# Patient Record
Sex: FEMALE | Race: WHITE | NOT HISPANIC OR LATINO | Employment: UNEMPLOYED | URBAN - METROPOLITAN AREA
[De-identification: names, ages, dates, MRNs, and addresses within clinical notes are randomized per-mention and may not be internally consistent; named-entity substitution may affect disease eponyms.]

---

## 2017-04-17 ENCOUNTER — APPOINTMENT (OUTPATIENT)
Dept: MRI IMAGING | Facility: HOSPITAL | Age: 57
End: 2017-04-17
Attending: EMERGENCY MEDICINE

## 2017-04-17 ENCOUNTER — APPOINTMENT (OUTPATIENT)
Dept: CT IMAGING | Facility: HOSPITAL | Age: 57
End: 2017-04-17

## 2017-04-17 ENCOUNTER — HOSPITAL ENCOUNTER (INPATIENT)
Facility: HOSPITAL | Age: 57
LOS: 3 days | Discharge: HOME HEALTH | End: 2017-04-20
Attending: EMERGENCY MEDICINE | Admitting: HOSPITALIST

## 2017-04-17 DIAGNOSIS — R26.9 GAIT DISTURBANCE: ICD-10-CM

## 2017-04-17 DIAGNOSIS — M47.12 CERVICAL SPONDYLOSIS WITH MYELOPATHY: Primary | ICD-10-CM

## 2017-04-17 DIAGNOSIS — Z74.09 IMPAIRED FUNCTIONAL MOBILITY, BALANCE, GAIT, AND ENDURANCE: ICD-10-CM

## 2017-04-17 DIAGNOSIS — Z78.9 IMPAIRED MOBILITY AND ADLS: ICD-10-CM

## 2017-04-17 DIAGNOSIS — Z74.09 IMPAIRED MOBILITY AND ADLS: ICD-10-CM

## 2017-04-17 DIAGNOSIS — M48.02 SPINAL STENOSIS OF CERVICAL REGION: ICD-10-CM

## 2017-04-17 DIAGNOSIS — R20.2 PARESTHESIA OF UPPER AND LOWER EXTREMITIES OF BOTH SIDES: ICD-10-CM

## 2017-04-17 LAB
ALBUMIN SERPL-MCNC: 4.6 G/DL (ref 3.2–4.8)
ALBUMIN/GLOB SERPL: 1.2 G/DL (ref 1.5–2.5)
ALP SERPL-CCNC: 176 U/L (ref 25–100)
ALT SERPL W P-5'-P-CCNC: 17 U/L (ref 7–40)
AMPHET+METHAMPHET UR QL: NEGATIVE
AMPHETAMINES UR QL: NEGATIVE
ANION GAP SERPL CALCULATED.3IONS-SCNC: 3 MMOL/L (ref 3–11)
AST SERPL-CCNC: 25 U/L (ref 0–33)
BACTERIA UR QL AUTO: ABNORMAL /HPF
BARBITURATES UR QL SCN: NEGATIVE
BASOPHILS # BLD AUTO: 0.06 10*3/MM3 (ref 0–0.2)
BASOPHILS NFR BLD AUTO: 0.7 % (ref 0–1)
BENZODIAZ UR QL SCN: NEGATIVE
BILIRUB SERPL-MCNC: 0.2 MG/DL (ref 0.3–1.2)
BILIRUB UR QL STRIP: NEGATIVE
BUN BLD-MCNC: 7 MG/DL (ref 9–23)
BUN/CREAT SERPL: 8.8 (ref 7–25)
BUPRENORPHINE SERPL-MCNC: NEGATIVE NG/ML
CALCIUM SPEC-SCNC: 10.6 MG/DL (ref 8.7–10.4)
CANNABINOIDS SERPL QL: POSITIVE
CHLORIDE SERPL-SCNC: 101 MMOL/L (ref 99–109)
CK SERPL-CCNC: 124 U/L (ref 26–174)
CLARITY UR: CLEAR
CO2 SERPL-SCNC: 34 MMOL/L (ref 20–31)
COCAINE UR QL: NEGATIVE
COLOR UR: YELLOW
CREAT BLD-MCNC: 0.8 MG/DL (ref 0.6–1.3)
DEPRECATED RDW RBC AUTO: 48.1 FL (ref 37–54)
EOSINOPHIL # BLD AUTO: 0.16 10*3/MM3 (ref 0.1–0.3)
EOSINOPHIL NFR BLD AUTO: 1.8 % (ref 0–3)
ERYTHROCYTE [DISTWIDTH] IN BLOOD BY AUTOMATED COUNT: 14.7 % (ref 11.3–14.5)
GFR SERPL CREATININE-BSD FRML MDRD: 75 ML/MIN/1.73
GLOBULIN UR ELPH-MCNC: 3.7 GM/DL
GLUCOSE BLD-MCNC: 75 MG/DL (ref 70–100)
GLUCOSE UR STRIP-MCNC: NEGATIVE MG/DL
HCT VFR BLD AUTO: 38.7 % (ref 34.5–44)
HGB BLD-MCNC: 12.8 G/DL (ref 11.5–15.5)
HGB UR QL STRIP.AUTO: ABNORMAL
HYALINE CASTS UR QL AUTO: ABNORMAL /LPF
IMM GRANULOCYTES # BLD: 0.02 10*3/MM3 (ref 0–0.03)
IMM GRANULOCYTES NFR BLD: 0.2 % (ref 0–0.6)
INR PPP: 1.01
KETONES UR QL STRIP: NEGATIVE
LEUKOCYTE ESTERASE UR QL STRIP.AUTO: NEGATIVE
LYMPHOCYTES # BLD AUTO: 3.2 10*3/MM3 (ref 0.6–4.8)
LYMPHOCYTES NFR BLD AUTO: 35.1 % (ref 24–44)
MAGNESIUM SERPL-MCNC: 2 MG/DL (ref 1.3–2.7)
MCH RBC QN AUTO: 29.8 PG (ref 27–31)
MCHC RBC AUTO-ENTMCNC: 33.1 G/DL (ref 32–36)
MCV RBC AUTO: 90.2 FL (ref 80–99)
METHADONE UR QL SCN: NEGATIVE
MONOCYTES # BLD AUTO: 0.54 10*3/MM3 (ref 0–1)
MONOCYTES NFR BLD AUTO: 5.9 % (ref 0–12)
MYOGLOBIN SERPL-MCNC: 40 NG/ML (ref 3–110)
NEUTROPHILS # BLD AUTO: 5.14 10*3/MM3 (ref 1.5–8.3)
NEUTROPHILS NFR BLD AUTO: 56.3 % (ref 41–71)
NITRITE UR QL STRIP: NEGATIVE
OPIATES UR QL: NEGATIVE
OXYCODONE UR QL SCN: NEGATIVE
PCP UR QL SCN: NEGATIVE
PH UR STRIP.AUTO: 8 [PH] (ref 5–8)
PLATELET # BLD AUTO: 448 10*3/MM3 (ref 150–450)
PMV BLD AUTO: 9.8 FL (ref 6–12)
POTASSIUM BLD-SCNC: 3.7 MMOL/L (ref 3.5–5.5)
PROPOXYPH UR QL: NEGATIVE
PROT SERPL-MCNC: 8.3 G/DL (ref 5.7–8.2)
PROT UR QL STRIP: NEGATIVE
PROTHROMBIN TIME: 11 SECONDS (ref 9.6–11.5)
RBC # BLD AUTO: 4.29 10*6/MM3 (ref 3.89–5.14)
RBC # UR: ABNORMAL /HPF
REF LAB TEST METHOD: ABNORMAL
SODIUM BLD-SCNC: 138 MMOL/L (ref 132–146)
SP GR UR STRIP: 1.01 (ref 1–1.03)
SQUAMOUS #/AREA URNS HPF: ABNORMAL /HPF
T4 FREE SERPL-MCNC: 1.11 NG/DL (ref 0.89–1.76)
TRICYCLICS UR QL SCN: NEGATIVE
TSH SERPL DL<=0.05 MIU/L-ACNC: 1.26 MIU/ML (ref 0.35–5.35)
UROBILINOGEN UR QL STRIP: ABNORMAL
VIT B12 BLD-MCNC: 487 PG/ML (ref 211–911)
WBC NRBC COR # BLD: 9.12 10*3/MM3 (ref 3.5–10.8)
WBC UR QL AUTO: ABNORMAL /HPF

## 2017-04-17 PROCEDURE — 85025 COMPLETE CBC W/AUTO DIFF WBC: CPT | Performed by: EMERGENCY MEDICINE

## 2017-04-17 PROCEDURE — 83921 ORGANIC ACID SINGLE QUANT: CPT | Performed by: EMERGENCY MEDICINE

## 2017-04-17 PROCEDURE — 0 GADOBENATE DIMEGLUMINE 529 MG/ML SOLUTION: Performed by: EMERGENCY MEDICINE

## 2017-04-17 PROCEDURE — 99220 PR INITIAL OBSERVATION CARE/DAY 70 MINUTES: CPT | Performed by: FAMILY MEDICINE

## 2017-04-17 PROCEDURE — 83735 ASSAY OF MAGNESIUM: CPT | Performed by: EMERGENCY MEDICINE

## 2017-04-17 PROCEDURE — 82607 VITAMIN B-12: CPT | Performed by: EMERGENCY MEDICINE

## 2017-04-17 PROCEDURE — 84439 ASSAY OF FREE THYROXINE: CPT | Performed by: EMERGENCY MEDICINE

## 2017-04-17 PROCEDURE — 82390 ASSAY OF CERULOPLASMIN: CPT | Performed by: EMERGENCY MEDICINE

## 2017-04-17 PROCEDURE — 82550 ASSAY OF CK (CPK): CPT | Performed by: EMERGENCY MEDICINE

## 2017-04-17 PROCEDURE — 72156 MRI NECK SPINE W/O & W/DYE: CPT

## 2017-04-17 PROCEDURE — 84425 ASSAY OF VITAMIN B-1: CPT | Performed by: EMERGENCY MEDICINE

## 2017-04-17 PROCEDURE — 80053 COMPREHEN METABOLIC PANEL: CPT | Performed by: EMERGENCY MEDICINE

## 2017-04-17 PROCEDURE — G0378 HOSPITAL OBSERVATION PER HR: HCPCS

## 2017-04-17 PROCEDURE — 99285 EMERGENCY DEPT VISIT HI MDM: CPT

## 2017-04-17 PROCEDURE — 85610 PROTHROMBIN TIME: CPT | Performed by: NEUROLOGICAL SURGERY

## 2017-04-17 PROCEDURE — 84207 ASSAY OF VITAMIN B-6: CPT | Performed by: EMERGENCY MEDICINE

## 2017-04-17 PROCEDURE — 80306 DRUG TEST PRSMV INSTRMNT: CPT | Performed by: EMERGENCY MEDICINE

## 2017-04-17 PROCEDURE — 70553 MRI BRAIN STEM W/O & W/DYE: CPT

## 2017-04-17 PROCEDURE — 82525 ASSAY OF COPPER: CPT | Performed by: EMERGENCY MEDICINE

## 2017-04-17 PROCEDURE — 72158 MRI LUMBAR SPINE W/O & W/DYE: CPT

## 2017-04-17 PROCEDURE — 84443 ASSAY THYROID STIM HORMONE: CPT | Performed by: EMERGENCY MEDICINE

## 2017-04-17 PROCEDURE — 83874 ASSAY OF MYOGLOBIN: CPT | Performed by: EMERGENCY MEDICINE

## 2017-04-17 PROCEDURE — 25010000002 LORAZEPAM PER 2 MG: Performed by: EMERGENCY MEDICINE

## 2017-04-17 PROCEDURE — 72157 MRI CHEST SPINE W/O & W/DYE: CPT

## 2017-04-17 PROCEDURE — 99253 IP/OBS CNSLTJ NEW/EST LOW 45: CPT | Performed by: PHYSICIAN ASSISTANT

## 2017-04-17 PROCEDURE — 72125 CT NECK SPINE W/O DYE: CPT

## 2017-04-17 PROCEDURE — 81001 URINALYSIS AUTO W/SCOPE: CPT | Performed by: EMERGENCY MEDICINE

## 2017-04-17 PROCEDURE — A9577 INJ MULTIHANCE: HCPCS | Performed by: EMERGENCY MEDICINE

## 2017-04-17 RX ORDER — SODIUM CHLORIDE 0.9 % (FLUSH) 0.9 %
1-10 SYRINGE (ML) INJECTION AS NEEDED
Status: DISCONTINUED | OUTPATIENT
Start: 2017-04-17 | End: 2017-04-19 | Stop reason: SDUPTHER

## 2017-04-17 RX ORDER — OXYCODONE HYDROCHLORIDE AND ACETAMINOPHEN 5; 325 MG/1; MG/1
1 TABLET ORAL EVERY 4 HOURS PRN
Status: DISCONTINUED | OUTPATIENT
Start: 2017-04-17 | End: 2017-04-20 | Stop reason: HOSPADM

## 2017-04-17 RX ORDER — DOCUSATE SODIUM 100 MG/1
100 CAPSULE, LIQUID FILLED ORAL 2 TIMES DAILY
Status: DISCONTINUED | OUTPATIENT
Start: 2017-04-17 | End: 2017-04-20 | Stop reason: HOSPADM

## 2017-04-17 RX ORDER — DULOXETIN HYDROCHLORIDE 60 MG/1
120 CAPSULE, DELAYED RELEASE ORAL DAILY
Status: DISCONTINUED | OUTPATIENT
Start: 2017-04-17 | End: 2017-04-20 | Stop reason: HOSPADM

## 2017-04-17 RX ORDER — TRIAMTERENE AND HYDROCHLOROTHIAZIDE 37.5; 25 MG/1; MG/1
1 TABLET ORAL DAILY
Status: DISCONTINUED | OUTPATIENT
Start: 2017-04-18 | End: 2017-04-20 | Stop reason: HOSPADM

## 2017-04-17 RX ORDER — PANTOPRAZOLE SODIUM 40 MG/1
40 TABLET, DELAYED RELEASE ORAL
Status: DISCONTINUED | OUTPATIENT
Start: 2017-04-18 | End: 2017-04-20 | Stop reason: HOSPADM

## 2017-04-17 RX ORDER — LORAZEPAM 2 MG/ML
1 INJECTION INTRAMUSCULAR EVERY 4 HOURS PRN
Status: DISCONTINUED | OUTPATIENT
Start: 2017-04-17 | End: 2017-04-17

## 2017-04-17 RX ORDER — TRIAMTERENE AND HYDROCHLOROTHIAZIDE 37.5; 25 MG/1; MG/1
1 CAPSULE ORAL EVERY MORNING
COMMUNITY

## 2017-04-17 RX ORDER — LORAZEPAM 0.5 MG/1
0.5 TABLET ORAL EVERY 6 HOURS PRN
Status: DISCONTINUED | OUTPATIENT
Start: 2017-04-17 | End: 2017-04-20 | Stop reason: HOSPADM

## 2017-04-17 RX ORDER — ONDANSETRON 2 MG/ML
4 INJECTION INTRAMUSCULAR; INTRAVENOUS EVERY 6 HOURS PRN
Status: DISCONTINUED | OUTPATIENT
Start: 2017-04-17 | End: 2017-04-19 | Stop reason: SDUPTHER

## 2017-04-17 RX ORDER — NICOTINE 21 MG/24HR
1 PATCH, TRANSDERMAL 24 HOURS TRANSDERMAL DAILY
Status: DISCONTINUED | OUTPATIENT
Start: 2017-04-17 | End: 2017-04-20 | Stop reason: HOSPADM

## 2017-04-17 RX ORDER — ESOMEPRAZOLE MAGNESIUM 40 MG/1
40 CAPSULE, DELAYED RELEASE ORAL DAILY
COMMUNITY
End: 2018-03-05

## 2017-04-17 RX ORDER — LORAZEPAM 2 MG/ML
1 INJECTION INTRAMUSCULAR ONCE
Status: COMPLETED | OUTPATIENT
Start: 2017-04-17 | End: 2017-04-17

## 2017-04-17 RX ORDER — ONDANSETRON 4 MG/1
4 TABLET, FILM COATED ORAL EVERY 6 HOURS PRN
Status: DISCONTINUED | OUTPATIENT
Start: 2017-04-17 | End: 2017-04-19 | Stop reason: SDUPTHER

## 2017-04-17 RX ORDER — ACETAMINOPHEN 325 MG/1
650 TABLET ORAL EVERY 4 HOURS PRN
Status: DISCONTINUED | OUTPATIENT
Start: 2017-04-17 | End: 2017-04-20 | Stop reason: HOSPADM

## 2017-04-17 RX ADMIN — GADOBENATE DIMEGLUMINE 11 ML: 529 INJECTION, SOLUTION INTRAVENOUS at 13:25

## 2017-04-17 RX ADMIN — NICOTINE 1 PATCH: 21 PATCH, EXTENDED RELEASE TRANSDERMAL at 19:29

## 2017-04-17 RX ADMIN — LORAZEPAM 1 MG: 2 INJECTION INTRAMUSCULAR; INTRAVENOUS at 12:02

## 2017-04-17 RX ADMIN — DOCUSATE SODIUM 100 MG: 100 CAPSULE, LIQUID FILLED ORAL at 19:29

## 2017-04-17 NOTE — H&P
[facility] Medicine Services  HISTORY AND PHYSICAL    Primary Care Physician: Asael Lockhart MD    Subjective     Chief Complaint:  Neck pain, arm and leg numbness    History of Present Illness:      The patient is a 56 year old female who presented to the ED with complaints of numbness of the hands for approximately one year that has been worsening over the past month.  The patient reported that about a year ago she began having neck pain and spasms that have progressively worsened.  She did see a chiropractor at the time with no significant improvement of her symptoms.  She has had some progressive numbness and weakness since then, now in all of her extremities.  The patient has also reported some urinary incontinence as well as some constipation and fecal incontinence over the past month or so.  She states that she has been having some difficulty with ambulation and an unsteady gait for approximately one month.  The patient was concerned that she was having a stroke and did undergo imaging of her brain that was performed by her PCP, but she hasn't had any imaging of her spine that she knows of.  In the ED, she underwent an MRI of her C spine that showed severe central spinal canal stenosis at the C3/C4 level with large left paracentral disc protrusion.  Narrowing of C5/C6 and C6/C7 with mass effect on the spinal cord.  Dr. Garner was consulted by the ED and she will be taken to the OR tomorrow for a laminectomy.  Hospital medicine services was contacted for admission.         Review of Systems   Constitutional: Positive for activity change and fatigue.   HENT: Negative for drooling and trouble swallowing.    Eyes: Positive for visual disturbance (blurry vision).   Respiratory: Positive for cough (loose) and shortness of breath. Negative for wheezing.    Cardiovascular: Negative for chest pain and palpitations.   Gastrointestinal: Positive for abdominal distention, abdominal pain  and constipation. Negative for nausea and vomiting.        Bowel incontinence at times   Endocrine: Negative.    Genitourinary: Positive for difficulty urinating and urgency.        Urinary incontinence, difficulty initiating, can't empty   Musculoskeletal: Positive for arthralgias (knees), gait problem, neck pain and neck stiffness.   Allergic/Immunologic: Negative.    Neurological: Positive for dizziness, weakness (generalized) and headaches.   Hematological: Negative.    Psychiatric/Behavioral: Positive for confusion (occasonal).      Otherwise complete ROS performed and negative except as mentioned in the HPI.    Past Medical History:   Diagnosis Date   • Arthritis    • Depression    • GERD (gastroesophageal reflux disease)    • Hypertension        Past Surgical History:   Procedure Laterality Date   • CHOLECYSTECTOMY     • DILATATION AND CURETTAGE     • TUBAL ABDOMINAL LIGATION  04/2004    [facility]       Family History   Problem Relation Age of Onset   • Diabetes Mother    • Hypertension Mother    • Osteoporosis Mother    • Hypertension Father    • Heart disease Father    • Osteoporosis Father    • Diabetes Maternal Grandmother    • Hypertension Maternal Grandmother    • Heart disease Paternal Grandfather    • Osteoporosis Paternal Grandfather        Social History     Social History   • Marital status: Single     Spouse name: N/A   • Number of children: N/A   • Years of education: N/A     Occupational History   •  [employer]     Social History Main Topics   • Smoking status: Current Every Day Smoker     Packs/day: 1.00     Years: 35.00     Types: Cigarettes   • Smokeless tobacco: Not on file   • Alcohol use No   • Drug use: Yes     Special: Marijuana, Cocaine      Comment: HISTORY OF   • Sexual activity: Defer     Other Topics Concern   • Not on file     Social History Narrative   • No narrative on file       Medications:    (Not in a hospital admission)    Allergies:  No Known  "Allergies      Objective     Physical Exam:  Vital Signs: /57  Pulse 77  Temp 98 °F (36.7 °C) (Oral)   Resp 16  Ht 60\" (152.4 cm)  Wt 125 lb (56.7 kg)  SpO2 (!) 86%  BMI 24.41 kg/m2  Physical Exam    General: Sitting up on the side of bed, NAD, family at bedside.  HEENT: Normocephalic, mucous membranes moist, pupils equal  Neck: Supple, trachea midline  Cardiovascular: Regular rate and rhythm, S1-S2, no murmur  Respiratory: Clear to auscultation bilaterally, even unlabored breathing on room air  Abdomen: Soft, nontender, nondistended, bowel sounds +  Skin: Clean, dry, intact, no lesions or sores  Extremities: DELEON, Symmetrical, pulses +, no edema noted  Neuro: Alert, oriented ×4, appropriate and cooperative,  and pedal pushes equal, Numbness of upper and lower extremities, severe ataxia noted, hyper-reflexive DTRs.      Results Reviewed:    Results from last 7 days  Lab Units 04/17/17  1127   WBC 10*3/mm3 9.12   HEMOGLOBIN g/dL 12.8   PLATELETS 10*3/mm3 448       Results from last 7 days  Lab Units 04/17/17  1127   SODIUM mmol/L 138   POTASSIUM mmol/L 3.7   TOTAL CO2 mmol/L 34.0*   CREATININE mg/dL 0.80   GLUCOSE mg/dL 75   CALCIUM mg/dL 10.6*       I have personally reviewed and interpreted available lab data, radiology studies and ECG obtained at time of admission.     Assessment / Plan     Assessment/Problem List:   Hospital Problem List     * (Principal)Cervical spondylosis with myelopathy    Hypertension    GERD (gastroesophageal reflux disease)    Arthritis    Depression        Plan:    -Prepare patient for surgery tomorrow.  NPO after midnight  -f/u remaining pending imaging, NS will also review  -Resume home antihypertensive  -Resume cymbalta  -Nicotine patch, tobacco cessation teaching.    -PT/OT        DVT prophylaxis: Per Neurosurgery, OR planned for am  Code Status: Full Code       Lian Gonzalez, PATRICIO 04/17/17 4:42 PM        Brief Attending Note       I have seen and examined the patient, " performing an independent face-to-face diagnostic evaluation with plan of care reviewed and developed with the advanced practice clinician (APC).      Brief Summary Statement/HPI:   Please see throrough history above as per APRN.  Briefly, active working 57yo F presents with 1 yr progressive hx of L>R BUE tingling/paresthesias and weakness associated with neck and upper arm muscular spasms.  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  Has also more recently developed gait disturbance (shuffling, widened gait) and saddle paresthesia with some urine/bowel incontinence episodes.  Patient was concerned was having possible TIA's, seen by PCP and sent to ED given history/exam. Cervical imaging confirms significant cervical myelopathy and thoracic imaging is stil pending.  NS has evaluated and will f/u other imaging but does tentatively plan for C3-4 laminectomy tomorrow am with further interventions to be determined based on pending work up.       Attending Physical Exam:  Temp:  [97.9 °F (36.6 °C)-98 °F (36.7 °C)] 97.9 °F (36.6 °C)  Heart Rate:  [55-77] 55  Resp:  [16] 16  BP: (129-177)/(44-80) 129/80  Constitutional: no acute distress, awake, alert  Eyes: PERRLA, sclerae anicteric, no conjunctival injection  Neck: supple, no thyromegaly, trachea midline  Respiratory: Clear to auscultation bilaterally, nonlabored respirations   Cardiovascular: RRR, no murmur  Gastrointestinal: Positive bowel sounds, soft, nontender, nondistended  Musculoskeletal: No bilateral ankle edema, no clubbing or cyanosis to bilateral lower extremities  Psychiatric: oriented x 3, appropriate affect, cooperative  Neurologic: Strength symmetric in all extremities, fine touch paresthesia of lateral BUE, widened unsteady stance and gait with hesitancy with iniation of step, Cranial Nerves  grossly intact to confrontation             Brief Assessment/Plan :      See above for further detailed assessment and plan developed with APC which I have reviewed and/or edited.    I believe this patient meets INPATIENT status due to the need for care which can only be reasonably provided in an hospital setting such as aggressive/expedited ancillary services and/or consultation services and the necessity for IV medications, close physician monitoring and/or the possible need for procedures.  In such, I feel patient’s risk for adverse outcomes and need for care warrant INPATIENT evaluation and predict the patient’s care encounter to likely last beyond 2 midnights.      Melissa Lawrence MD  04/17/17  7:37 PM

## 2017-04-17 NOTE — ED PROVIDER NOTES
Subjective   HPI Comments: Maribel López is a 56 y.o.female who presents to the ED c/o worsening numbness for the last year that has significantly worsened over the past month. Pt reports a year ago she began feeling neck spasms that progressively worsened. She saw a chiropractor who did not improve her sx. Since then she has had progressive numbness and weakness in all of her extremities. She also notes that she goes for days without a BM. She states she cant feel when she is passing a BM. She has had unremarkable upper endoscopy and MRI studies.    FHx of TIA. Dr. Lockhart is her PCP.  Patient is a 56 y.o. female presenting with general illness.   History provided by:  Patient and relative  Illness   Location:  Extremities  Quality:  Numb and weak  Severity:  Moderate  Onset quality:  Gradual  Duration:  12 months  Timing:  Constant  Progression:  Worsening  Chronicity:  New  Associated symptoms: no loss of consciousness and no shortness of breath        Review of Systems   Respiratory: Negative for shortness of breath.    Gastrointestinal: Positive for constipation.        Goes for 2-3 days without having a BM then will have bloating. Cannot feel feces passing.   Neurological: Positive for weakness and numbness. Negative for loss of consciousness.   All other systems reviewed and are negative.      Past Medical History:   Diagnosis Date   • Arthritis    • GERD (gastroesophageal reflux disease)    • Hypertension        No Known Allergies    Past Surgical History:   Procedure Laterality Date   • CHOLECYSTECTOMY     • DILATATION AND CURETTAGE     • TUBAL ABDOMINAL LIGATION  04/2004    [facility]       Family History   Problem Relation Age of Onset   • Diabetes Mother    • Hypertension Mother    • Osteoporosis Mother    • Hypertension Father    • Heart disease Father    • Osteoporosis Father    • Diabetes Maternal Grandmother    • Hypertension Maternal Grandmother    • Heart disease Paternal Grandfather    •  Osteoporosis Paternal Grandfather        Social History     Social History   • Marital status: Single     Spouse name: N/A   • Number of children: N/A   • Years of education: N/A     Social History Main Topics   • Smoking status: Current Every Day Smoker     Packs/day: 1.00     Types: Cigarettes   • Smokeless tobacco: None   • Alcohol use No   • Drug use: Yes     Special: Marijuana, Cocaine      Comment: HISTORY OF   • Sexual activity: Defer     Other Topics Concern   • None     Social History Narrative   • None         Objective   Physical Exam   Constitutional: She is oriented to person, place, and time. She appears well-developed and well-nourished.   HENT:   Head: Normocephalic and atraumatic.   Right Ear: External ear normal.   Left Ear: External ear normal.   Nose: Nose normal.   Eyes: Conjunctivae are normal.   Neck: Normal range of motion. Neck supple.   Cardiovascular: Normal rate, regular rhythm and normal heart sounds.  Exam reveals no gallop and no friction rub.    No murmur heard.  Pulmonary/Chest: Effort normal and breath sounds normal. No respiratory distress. She has no wheezes. She has no rales.   Abdominal: Soft. There is no tenderness.   Musculoskeletal: Normal range of motion.   Neurological: She is alert and oriented to person, place, and time. She has normal strength. She displays abnormal reflex. She exhibits normal muscle tone. Gait (Ataxic gait) abnormal.   Reflex Scores:       Bicep reflexes are 3+ on the right side and 3+ on the left side.       Patellar reflexes are 3+ on the right side and 3+ on the left side.  Romberg sign is present.   Skin: Skin is warm and dry.   Psychiatric: She has a normal mood and affect. Her behavior is normal. Judgment and thought content normal.   Nursing note and vitals reviewed.      Procedures         ED Course  ED Course     Grossly abnormal gait.  Hyperreflexic.  I discussed with Dr Esparza who recommended imaging entire spine as well as lab eval.  Labs  so far are pretty normal.  MRI shows significant spinal stenosis/disease at multiple levels with cord edema seen at C3-4 and C5-6/6-7.  I suspect this compression is the cause of her symptoms.  I discussed this result with Dr Garner who reviewed the images with me and he wishes to operate tomorrow.  Patient stable on serial rechecks.  Discussed exam findings, test results so far and concerns in detail at the bedside.  Discussed need for admission for further evaluation and treatment.                  MDM  Number of Diagnoses or Management Options  Gait disturbance:   Paresthesia of upper and lower extremities of both sides:   Spinal stenosis of cervical region:      Amount and/or Complexity of Data Reviewed  Clinical lab tests: ordered and reviewed  Tests in the radiology section of CPT®: ordered and reviewed  Discuss the patient with other providers: yes  Independent visualization of images, tracings, or specimens: yes        Final diagnoses:   Spinal stenosis of cervical region   Gait disturbance   Paresthesia of upper and lower extremities of both sides       Documentation assistance provided by jaziel Avendaño.  Information recorded by the scribe was done at my direction and has been verified and validated by me.     Ever Avendaño  04/17/17 1001       Rupesh Early MD  04/17/17 0475

## 2017-04-17 NOTE — PROGRESS NOTES
Informed consent for a C3 4 cervical laminectomy with possible fusion was obtained from the patient.  She acknowledges risk of spinal cord injury, nerve root injury, paralysis, weakness, loss sensation, permanent neurologic disability, bleeding, infection, iatrogenic instability, failure of benefit of the operation, or need for additional procedures.  All questions were answered.  No guarantees were given or implied.  We will tentatively plan for a C3 4 laminectomy for cervical myelopathy in the morning.

## 2017-04-17 NOTE — CONSULTS
NEUROSURGERY CONSULT    Referring Provider: No ref. provider found  Reason for Consultation:     Patient Care Team:  Asael Lockhart MD as PCP - General (Family Medicine)    Chief complaint: Gait disturbance, hand weakness, bowel and bladder dysfunction    Subjective .     History of present illness:    Ms. López is a 57yo F who began having symptoms of neck and arm spasms (left more than the right hand) approximately one year ago. There were no precipitating incidences or accidents.  She saw her doctor and has been for chiropractic care, but the symptoms have become progressively worse.  Over the last 4-5 months, she started having spasms into her legs and bilateral feet as well. Additionally, she has developed saddle anesthesia, constipation and inability to feel bowel movements.  She knows when her bladder is full, but has very little lead time to get to the restroom one she experiences this and has incontinence at home and sometimes at work.    Over the last few weeks, she has started having increasing gait disturbance with a few falls at home when walking on a flat surface. She fell about two weeks ago, striking the wall and leaving a hole in the drywall.  She said her hand symptoms have increased since then. She has also had increased numbness in her right hand and can no longer hold a pen or write.  She states that she has been working up until this point, and has been able to type, but has become increasingly unable to meet her job responsibilities.    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  She has not been using a cane, walker, or other support.    She has been concerned that she was having TIA's due to a family history of these and saw her PCP today. They reviewed her MRI and sent her to the ED.     Results Review:  MRI of the cervical spine shows:  "  Abnormal signal intensity within the spinal cord with severe  central spinal canal stenosis at the C3/C4 level. Large left paracentral  disc protrusion creating severe central spinal canal stenosis and  narrowing of the left neuroforamina. Compromise again seen with edema in  the spinal cord. There is narrowing as well seen at the C5/C6 and C6/C7  levels with mass effect on the spinal cord. Minimal cord changes seen at  the C5/C6 level posteriorly. Clinical correlation is needed.    MRI of the brain, thoracic, and lumbar spine were unremarkable.     Review of Systems  Pertinent items are noted in HPI, all other systems reviewed and are negative.     History  Past Medical History:   Diagnosis Date   • Arthritis    • Depression    • GERD (gastroesophageal reflux disease)    • Hypertension    , Past Surgical History:   Procedure Laterality Date   • CHOLECYSTECTOMY     • DILATATION AND CURETTAGE     • TUBAL ABDOMINAL LIGATION  04/2004    [facility]   , Family History   Problem Relation Age of Onset   • Diabetes Mother    • Hypertension Mother    • Osteoporosis Mother    • Hypertension Father    • Heart disease Father    • Osteoporosis Father    • Diabetes Maternal Grandmother    • Hypertension Maternal Grandmother    • Heart disease Paternal Grandfather    • Osteoporosis Paternal Grandfather    , Social History   Substance Use Topics   • Smoking status: Current Every Day Smoker     Packs/day: 1.00     Years: 35.00     Types: Cigarettes   • Smokeless tobacco: None   • Alcohol use No   ,   (Not in a hospital admission) and Allergies:  Review of patient's allergies indicates no known allergies.    Objective     Vital Signs   Blood pressure 156/44, pulse 57, temperature 98 °F (36.7 °C), temperature source Oral, resp. rate 16, height 60\" (152.4 cm), weight 125 lb (56.7 kg), SpO2 97 %.    Physical Exam:  Pt is awake, alert, and oriented x3. She is pleasant and cooperative.  Recent and remote memory are intact.       CRANIAL " NERVES:  Cranial nerve II:  Visual fields are full to confrontation.  Cranial nerves III, IV and VI:  PERRLADC.  Extraocular movements are intact.  Nystagmus is not present.  Cranial nerve V:  Facial sensation is intact to light touch.  Cranial nerve VII:  Muscles of facial expression reveal no asymmetry.  Cranial nerve VIII:  Hearing is intact to finger rub bilaterally.  Cranial nerves IX and X:  Palate elevates symmetrically.  Cranial nerve XI:  Shoulder shrug is intact.  Cranial nerve XII:  Tongue is midline without evidence of atrophy or fasciculation.    MOTOR: UE strength is 4/5 to direct testing.  strength is 3/5 bilaterally.    No hand atrophy.  Hip flexion, knee extension, ankle dorsiflexion and plantar flexion intact. Severe gait ataxia is present, with a wide-based gait.     SENSATION: numbness to light touch present in left upper extremity from shoulder to hand. In right hand, from forearm into hand, worst in three smallest fingers.    LE subjective numbness medial thigh, lateral thigh, and in bilateral calves and feet.  Position sense is intact.      REFLEXES: Hyperreflexive in upper and lower extremities, 3+.  Beck's positive bilaterally.  Cross-adductor reflex is present in lower extremities.      Assessment/Plan   Dr. Garner reviewed MRI of the cervical spine and recommends a posterior cervical laminectomy at C3-4. We have discussed the diagnosis, and procedure with Ms. López and outlined benefits and risks of the surgery. She is wanting to proceed.  She will be on the schedule for tomorrow.  She needs to be NPO after midnight.     Mica Wynn PA-C  04/17/17  5:35 PM

## 2017-04-18 ENCOUNTER — ANESTHESIA (OUTPATIENT)
Dept: PERIOP | Facility: HOSPITAL | Age: 57
End: 2017-04-18

## 2017-04-18 ENCOUNTER — APPOINTMENT (OUTPATIENT)
Dept: GENERAL RADIOLOGY | Facility: HOSPITAL | Age: 57
End: 2017-04-18

## 2017-04-18 ENCOUNTER — ANESTHESIA EVENT (OUTPATIENT)
Dept: PERIOP | Facility: HOSPITAL | Age: 57
End: 2017-04-18

## 2017-04-18 PROBLEM — Z72.0 TOBACCO ABUSE: Status: ACTIVE | Noted: 2017-04-18

## 2017-04-18 LAB
ALBUMIN SERPL-MCNC: 4.3 G/DL (ref 3.2–4.8)
ALBUMIN/GLOB SERPL: 1.4 G/DL (ref 1.5–2.5)
ALP SERPL-CCNC: 153 U/L (ref 25–100)
ALT SERPL W P-5'-P-CCNC: 17 U/L (ref 7–40)
ANION GAP SERPL CALCULATED.3IONS-SCNC: 8 MMOL/L (ref 3–11)
AST SERPL-CCNC: 20 U/L (ref 0–33)
B-HCG UR QL: NEGATIVE
BASOPHILS # BLD AUTO: 0.04 10*3/MM3 (ref 0–0.2)
BASOPHILS NFR BLD AUTO: 0.6 % (ref 0–1)
BILIRUB SERPL-MCNC: 0.4 MG/DL (ref 0.3–1.2)
BUN BLD-MCNC: 8 MG/DL (ref 9–23)
BUN/CREAT SERPL: 10 (ref 7–25)
CALCIUM SPEC-SCNC: 10.2 MG/DL (ref 8.7–10.4)
CERULOPLASMIN SERPL-MCNC: 46.9 MG/DL (ref 19–39)
CHLORIDE SERPL-SCNC: 101 MMOL/L (ref 99–109)
CO2 SERPL-SCNC: 29 MMOL/L (ref 20–31)
CREAT BLD-MCNC: 0.8 MG/DL (ref 0.6–1.3)
DEPRECATED RDW RBC AUTO: 48.6 FL (ref 37–54)
EOSINOPHIL # BLD AUTO: 0.19 10*3/MM3 (ref 0.1–0.3)
EOSINOPHIL NFR BLD AUTO: 2.8 % (ref 0–3)
ERYTHROCYTE [DISTWIDTH] IN BLOOD BY AUTOMATED COUNT: 14.8 % (ref 11.3–14.5)
GFR SERPL CREATININE-BSD FRML MDRD: 75 ML/MIN/1.73
GLOBULIN UR ELPH-MCNC: 3 GM/DL
GLUCOSE BLD-MCNC: 86 MG/DL (ref 70–100)
HCT VFR BLD AUTO: 36.9 % (ref 34.5–44)
HGB BLD-MCNC: 12.1 G/DL (ref 11.5–15.5)
IMM GRANULOCYTES # BLD: 0 10*3/MM3 (ref 0–0.03)
IMM GRANULOCYTES NFR BLD: 0 % (ref 0–0.6)
INR PPP: 1.03
INTERNAL NEGATIVE CONTROL: NEGATIVE
INTERNAL POSITIVE CONTROL: POSITIVE
LYMPHOCYTES # BLD AUTO: 2.67 10*3/MM3 (ref 0.6–4.8)
LYMPHOCYTES NFR BLD AUTO: 39.7 % (ref 24–44)
Lab: NORMAL
MCH RBC QN AUTO: 29.5 PG (ref 27–31)
MCHC RBC AUTO-ENTMCNC: 32.8 G/DL (ref 32–36)
MCV RBC AUTO: 90 FL (ref 80–99)
MONOCYTES # BLD AUTO: 0.69 10*3/MM3 (ref 0–1)
MONOCYTES NFR BLD AUTO: 10.3 % (ref 0–12)
NEUTROPHILS # BLD AUTO: 3.13 10*3/MM3 (ref 1.5–8.3)
NEUTROPHILS NFR BLD AUTO: 46.6 % (ref 41–71)
PLATELET # BLD AUTO: 429 10*3/MM3 (ref 150–450)
PMV BLD AUTO: 10.1 FL (ref 6–12)
POTASSIUM BLD-SCNC: 3.5 MMOL/L (ref 3.5–5.5)
PROT SERPL-MCNC: 7.3 G/DL (ref 5.7–8.2)
PROTHROMBIN TIME: 11.2 SECONDS (ref 9.6–11.5)
RBC # BLD AUTO: 4.1 10*6/MM3 (ref 3.89–5.14)
SODIUM BLD-SCNC: 138 MMOL/L (ref 132–146)
WBC NRBC COR # BLD: 6.72 10*3/MM3 (ref 3.5–10.8)

## 2017-04-18 PROCEDURE — 80053 COMPREHEN METABOLIC PANEL: CPT | Performed by: NURSE PRACTITIONER

## 2017-04-18 PROCEDURE — 63048 LAM FACETEC &FORAMOT EA ADDL: CPT | Performed by: NEUROLOGICAL SURGERY

## 2017-04-18 PROCEDURE — C1713 ANCHOR/SCREW BN/BN,TIS/BN: HCPCS | Performed by: NEUROLOGICAL SURGERY

## 2017-04-18 PROCEDURE — 25010000003 CEFAZOLIN IN DEXTROSE 2-4 GM/100ML-% SOLUTION: Performed by: NURSE ANESTHETIST, CERTIFIED REGISTERED

## 2017-04-18 PROCEDURE — 25010000002 HYDROMORPHONE PER 4 MG: Performed by: NEUROLOGICAL SURGERY

## 2017-04-18 PROCEDURE — 22840 INSERT SPINE FIXATION DEVICE: CPT | Performed by: NEUROLOGICAL SURGERY

## 2017-04-18 PROCEDURE — 0RG107J FUSION OF CERVICAL VERTEBRAL JOINT WITH AUTOLOGOUS TISSUE SUBSTITUTE, POSTERIOR APPROACH, ANTERIOR COLUMN, OPEN APPROACH: ICD-10-PCS | Performed by: NEUROLOGICAL SURGERY

## 2017-04-18 PROCEDURE — 25010000002 NEOSTIGMINE PER 0.5 MG: Performed by: NURSE ANESTHETIST, CERTIFIED REGISTERED

## 2017-04-18 PROCEDURE — 25010000002 PROPOFOL 10 MG/ML EMULSION: Performed by: NURSE ANESTHETIST, CERTIFIED REGISTERED

## 2017-04-18 PROCEDURE — 85025 COMPLETE CBC W/AUTO DIFF WBC: CPT | Performed by: NURSE PRACTITIONER

## 2017-04-18 PROCEDURE — G0378 HOSPITAL OBSERVATION PER HR: HCPCS

## 2017-04-18 PROCEDURE — 94799 UNLISTED PULMONARY SVC/PX: CPT

## 2017-04-18 PROCEDURE — 93005 ELECTROCARDIOGRAM TRACING: CPT | Performed by: ANESTHESIOLOGY

## 2017-04-18 PROCEDURE — 63045 LAM FACETEC & FORAMOT CRV: CPT | Performed by: NEUROLOGICAL SURGERY

## 2017-04-18 PROCEDURE — 25010000002 MIDAZOLAM PER 1 MG: Performed by: NURSE ANESTHETIST, CERTIFIED REGISTERED

## 2017-04-18 PROCEDURE — 25010000002 FENTANYL CITRATE (PF) 100 MCG/2ML SOLUTION: Performed by: NURSE ANESTHETIST, CERTIFIED REGISTERED

## 2017-04-18 PROCEDURE — 99225 PR SBSQ OBSERVATION CARE/DAY 25 MINUTES: CPT | Performed by: INTERNAL MEDICINE

## 2017-04-18 PROCEDURE — 76000 FLUOROSCOPY <1 HR PHYS/QHP: CPT

## 2017-04-18 PROCEDURE — 20936 SP BONE AGRFT LOCAL ADD-ON: CPT | Performed by: NEUROLOGICAL SURGERY

## 2017-04-18 PROCEDURE — 22600 ARTHRD PST TQ 1NTRSPC CRV: CPT | Performed by: NEUROLOGICAL SURGERY

## 2017-04-18 PROCEDURE — 25010000002 DEXAMETHASONE PER 1 MG: Performed by: NURSE ANESTHETIST, CERTIFIED REGISTERED

## 2017-04-18 PROCEDURE — 85610 PROTHROMBIN TIME: CPT | Performed by: NURSE PRACTITIONER

## 2017-04-18 PROCEDURE — 25010000002 ONDANSETRON PER 1 MG: Performed by: NURSE PRACTITIONER

## 2017-04-18 DEVICE — DBX PUTTY, 2.5CC
Type: IMPLANTABLE DEVICE | Status: FUNCTIONAL
Brand: DBX®

## 2017-04-18 DEVICE — ROD SPINE MOUNTAINEER 3.5X60MM: Type: IMPLANTABLE DEVICE | Status: FUNCTIONAL

## 2017-04-18 DEVICE — IMPLANTABLE DEVICE: Type: IMPLANTABLE DEVICE | Status: FUNCTIONAL

## 2017-04-18 DEVICE — SCRW INNR MOUNTAINEER: Type: IMPLANTABLE DEVICE | Status: FUNCTIONAL

## 2017-04-18 RX ORDER — TRIAMTERENE AND HYDROCHLOROTHIAZIDE 37.5; 25 MG/1; MG/1
1 TABLET ORAL ONCE
Status: COMPLETED | OUTPATIENT
Start: 2017-04-18 | End: 2017-04-18

## 2017-04-18 RX ORDER — NALOXONE HCL 0.4 MG/ML
0.4 VIAL (ML) INJECTION
Status: DISCONTINUED | OUTPATIENT
Start: 2017-04-18 | End: 2017-04-20 | Stop reason: HOSPADM

## 2017-04-18 RX ORDER — CEFAZOLIN SODIUM 2 G/100ML
INJECTION, SOLUTION INTRAVENOUS AS NEEDED
Status: DISCONTINUED | OUTPATIENT
Start: 2017-04-18 | End: 2017-04-18 | Stop reason: SURG

## 2017-04-18 RX ORDER — FENTANYL CITRATE 50 UG/ML
INJECTION, SOLUTION INTRAMUSCULAR; INTRAVENOUS AS NEEDED
Status: DISCONTINUED | OUTPATIENT
Start: 2017-04-18 | End: 2017-04-18 | Stop reason: SURG

## 2017-04-18 RX ORDER — HYDROCODONE BITARTRATE AND ACETAMINOPHEN 5; 325 MG/1; MG/1
1 TABLET ORAL EVERY 4 HOURS PRN
Status: DISCONTINUED | OUTPATIENT
Start: 2017-04-18 | End: 2017-04-20 | Stop reason: HOSPADM

## 2017-04-18 RX ORDER — PROPOFOL 10 MG/ML
VIAL (ML) INTRAVENOUS CONTINUOUS PRN
Status: DISCONTINUED | OUTPATIENT
Start: 2017-04-18 | End: 2017-04-18 | Stop reason: SURG

## 2017-04-18 RX ORDER — DULOXETIN HYDROCHLORIDE 60 MG/1
120 CAPSULE, DELAYED RELEASE ORAL ONCE
Status: COMPLETED | OUTPATIENT
Start: 2017-04-18 | End: 2017-04-18

## 2017-04-18 RX ORDER — PROPOFOL 10 MG/ML
VIAL (ML) INTRAVENOUS AS NEEDED
Status: DISCONTINUED | OUTPATIENT
Start: 2017-04-18 | End: 2017-04-18 | Stop reason: SURG

## 2017-04-18 RX ORDER — MIDAZOLAM HYDROCHLORIDE 1 MG/ML
INJECTION INTRAMUSCULAR; INTRAVENOUS AS NEEDED
Status: DISCONTINUED | OUTPATIENT
Start: 2017-04-18 | End: 2017-04-18 | Stop reason: SURG

## 2017-04-18 RX ORDER — ONDANSETRON 4 MG/1
4 TABLET, FILM COATED ORAL EVERY 6 HOURS PRN
Status: DISCONTINUED | OUTPATIENT
Start: 2017-04-18 | End: 2017-04-20 | Stop reason: HOSPADM

## 2017-04-18 RX ORDER — SODIUM CHLORIDE, SODIUM LACTATE, POTASSIUM CHLORIDE, CALCIUM CHLORIDE 600; 310; 30; 20 MG/100ML; MG/100ML; MG/100ML; MG/100ML
9 INJECTION, SOLUTION INTRAVENOUS CONTINUOUS
Status: DISCONTINUED | OUTPATIENT
Start: 2017-04-18 | End: 2017-04-20 | Stop reason: HOSPADM

## 2017-04-18 RX ORDER — SODIUM CHLORIDE 0.9 % (FLUSH) 0.9 %
1-10 SYRINGE (ML) INJECTION AS NEEDED
Status: DISCONTINUED | OUTPATIENT
Start: 2017-04-18 | End: 2017-04-20 | Stop reason: HOSPADM

## 2017-04-18 RX ORDER — ONDANSETRON 2 MG/ML
4 INJECTION INTRAMUSCULAR; INTRAVENOUS ONCE AS NEEDED
Status: DISCONTINUED | OUTPATIENT
Start: 2017-04-18 | End: 2017-04-18

## 2017-04-18 RX ORDER — GLYCOPYRROLATE 0.2 MG/ML
INJECTION INTRAMUSCULAR; INTRAVENOUS AS NEEDED
Status: DISCONTINUED | OUTPATIENT
Start: 2017-04-18 | End: 2017-04-18 | Stop reason: SURG

## 2017-04-18 RX ORDER — LIDOCAINE HYDROCHLORIDE 10 MG/ML
INJECTION, SOLUTION INFILTRATION; PERINEURAL AS NEEDED
Status: DISCONTINUED | OUTPATIENT
Start: 2017-04-18 | End: 2017-04-18 | Stop reason: SURG

## 2017-04-18 RX ORDER — FAMOTIDINE 20 MG/1
20 TABLET, FILM COATED ORAL ONCE
Status: DISCONTINUED | OUTPATIENT
Start: 2017-04-18 | End: 2017-04-18 | Stop reason: HOSPADM

## 2017-04-18 RX ORDER — DIAZEPAM 5 MG/ML
5 INJECTION, SOLUTION INTRAMUSCULAR; INTRAVENOUS EVERY 4 HOURS PRN
Status: DISCONTINUED | OUTPATIENT
Start: 2017-04-18 | End: 2017-04-20 | Stop reason: HOSPADM

## 2017-04-18 RX ORDER — DOCUSATE SODIUM 100 MG/1
100 CAPSULE, LIQUID FILLED ORAL 2 TIMES DAILY PRN
Status: DISCONTINUED | OUTPATIENT
Start: 2017-04-18 | End: 2017-04-20 | Stop reason: HOSPADM

## 2017-04-18 RX ORDER — LIDOCAINE HYDROCHLORIDE 10 MG/ML
5 INJECTION, SOLUTION EPIDURAL; INFILTRATION; INTRACAUDAL; PERINEURAL ONCE
Status: COMPLETED | OUTPATIENT
Start: 2017-04-18 | End: 2017-04-18

## 2017-04-18 RX ORDER — KETOROLAC TROMETHAMINE 15 MG/ML
15 INJECTION, SOLUTION INTRAMUSCULAR; INTRAVENOUS EVERY 6 HOURS PRN
Status: DISCONTINUED | OUTPATIENT
Start: 2017-04-18 | End: 2017-04-20 | Stop reason: HOSPADM

## 2017-04-18 RX ORDER — BISACODYL 10 MG
10 SUPPOSITORY, RECTAL RECTAL DAILY PRN
Status: DISCONTINUED | OUTPATIENT
Start: 2017-04-18 | End: 2017-04-20 | Stop reason: HOSPADM

## 2017-04-18 RX ORDER — DEXAMETHASONE SODIUM PHOSPHATE 4 MG/ML
INJECTION, SOLUTION INTRA-ARTICULAR; INTRALESIONAL; INTRAMUSCULAR; INTRAVENOUS; SOFT TISSUE AS NEEDED
Status: DISCONTINUED | OUTPATIENT
Start: 2017-04-18 | End: 2017-04-18 | Stop reason: SURG

## 2017-04-18 RX ORDER — FENTANYL CITRATE 50 UG/ML
50 INJECTION, SOLUTION INTRAMUSCULAR; INTRAVENOUS
Status: DISCONTINUED | OUTPATIENT
Start: 2017-04-18 | End: 2017-04-18

## 2017-04-18 RX ORDER — ONDANSETRON 2 MG/ML
4 INJECTION INTRAMUSCULAR; INTRAVENOUS EVERY 6 HOURS PRN
Status: DISCONTINUED | OUTPATIENT
Start: 2017-04-18 | End: 2017-04-20 | Stop reason: HOSPADM

## 2017-04-18 RX ORDER — ROCURONIUM BROMIDE 10 MG/ML
INJECTION, SOLUTION INTRAVENOUS AS NEEDED
Status: DISCONTINUED | OUTPATIENT
Start: 2017-04-18 | End: 2017-04-18 | Stop reason: SURG

## 2017-04-18 RX ORDER — LIDOCAINE HYDROCHLORIDE AND EPINEPHRINE 5; 5 MG/ML; UG/ML
INJECTION, SOLUTION INFILTRATION; PERINEURAL AS NEEDED
Status: DISCONTINUED | OUTPATIENT
Start: 2017-04-18 | End: 2017-04-18 | Stop reason: HOSPADM

## 2017-04-18 RX ORDER — SODIUM CHLORIDE 0.9 % (FLUSH) 0.9 %
1-10 SYRINGE (ML) INJECTION AS NEEDED
Status: DISCONTINUED | OUTPATIENT
Start: 2017-04-18 | End: 2017-04-18 | Stop reason: HOSPADM

## 2017-04-18 RX ORDER — MAGNESIUM HYDROXIDE 1200 MG/15ML
LIQUID ORAL AS NEEDED
Status: DISCONTINUED | OUTPATIENT
Start: 2017-04-18 | End: 2017-04-18 | Stop reason: HOSPADM

## 2017-04-18 RX ORDER — SENNA AND DOCUSATE SODIUM 50; 8.6 MG/1; MG/1
1 TABLET, FILM COATED ORAL NIGHTLY PRN
Status: DISCONTINUED | OUTPATIENT
Start: 2017-04-18 | End: 2017-04-20 | Stop reason: HOSPADM

## 2017-04-18 RX ADMIN — MIDAZOLAM HYDROCHLORIDE 2 MG: 1 INJECTION, SOLUTION INTRAMUSCULAR; INTRAVENOUS at 09:55

## 2017-04-18 RX ADMIN — CEFAZOLIN SODIUM 2 G: 2 INJECTION, SOLUTION INTRAVENOUS at 09:56

## 2017-04-18 RX ADMIN — NICOTINE 1 PATCH: 21 PATCH, EXTENDED RELEASE TRANSDERMAL at 14:31

## 2017-04-18 RX ADMIN — OXYCODONE AND ACETAMINOPHEN 1 TABLET: 5; 325 TABLET ORAL at 20:43

## 2017-04-18 RX ADMIN — TRIAMTERENE AND HYDROCHLOROTHIAZIDE 1 TABLET: 37.5; 25 TABLET ORAL at 23:07

## 2017-04-18 RX ADMIN — LIDOCAINE HYDROCHLORIDE 0.2 ML: 10 INJECTION, SOLUTION EPIDURAL; INFILTRATION; INTRACAUDAL; PERINEURAL at 08:33

## 2017-04-18 RX ADMIN — DOCUSATE SODIUM 100 MG: 100 CAPSULE, LIQUID FILLED ORAL at 17:10

## 2017-04-18 RX ADMIN — PROPOFOL 25 MCG/KG/MIN: 10 INJECTION, EMULSION INTRAVENOUS at 10:03

## 2017-04-18 RX ADMIN — ROBINUL 0.4 MG: 0.2 INJECTION INTRAMUSCULAR; INTRAVENOUS at 11:34

## 2017-04-18 RX ADMIN — DEXAMETHASONE SODIUM PHOSPHATE 8 MG: 4 INJECTION, SOLUTION INTRAMUSCULAR; INTRAVENOUS at 10:15

## 2017-04-18 RX ADMIN — SODIUM CHLORIDE, POTASSIUM CHLORIDE, SODIUM LACTATE AND CALCIUM CHLORIDE: 600; 310; 30; 20 INJECTION, SOLUTION INTRAVENOUS at 10:25

## 2017-04-18 RX ADMIN — ROCURONIUM BROMIDE 50 MG: 10 INJECTION INTRAVENOUS at 09:57

## 2017-04-18 RX ADMIN — Medication 3 MG: at 11:34

## 2017-04-18 RX ADMIN — SODIUM CHLORIDE, POTASSIUM CHLORIDE, SODIUM LACTATE AND CALCIUM CHLORIDE: 600; 310; 30; 20 INJECTION, SOLUTION INTRAVENOUS at 09:53

## 2017-04-18 RX ADMIN — Medication 10 ML: at 08:33

## 2017-04-18 RX ADMIN — ONDANSETRON 4 MG: 2 INJECTION INTRAMUSCULAR; INTRAVENOUS at 11:34

## 2017-04-18 RX ADMIN — FENTANYL CITRATE 100 MCG: 50 INJECTION, SOLUTION INTRAMUSCULAR; INTRAVENOUS at 09:57

## 2017-04-18 RX ADMIN — HYDROCODONE BITARTRATE AND ACETAMINOPHEN 1 TABLET: 5; 325 TABLET ORAL at 13:40

## 2017-04-18 RX ADMIN — SODIUM CHLORIDE, POTASSIUM CHLORIDE, SODIUM LACTATE AND CALCIUM CHLORIDE 9 ML/HR: 600; 310; 30; 20 INJECTION, SOLUTION INTRAVENOUS at 08:33

## 2017-04-18 RX ADMIN — LIDOCAINE HYDROCHLORIDE 50 MG: 10 INJECTION, SOLUTION INFILTRATION; PERINEURAL at 09:57

## 2017-04-18 RX ADMIN — FENTANYL CITRATE 50 MCG: 50 INJECTION, SOLUTION INTRAMUSCULAR; INTRAVENOUS at 11:30

## 2017-04-18 RX ADMIN — OXYCODONE AND ACETAMINOPHEN 1 TABLET: 5; 325 TABLET ORAL at 17:10

## 2017-04-18 RX ADMIN — FENTANYL CITRATE 50 MCG: 50 INJECTION, SOLUTION INTRAMUSCULAR; INTRAVENOUS at 10:45

## 2017-04-18 RX ADMIN — PROPOFOL 150 MG: 10 INJECTION, EMULSION INTRAVENOUS at 09:57

## 2017-04-18 RX ADMIN — DULOXETINE 120 MG: 60 CAPSULE, DELAYED RELEASE ORAL at 20:43

## 2017-04-18 RX ADMIN — SODIUM CHLORIDE, POTASSIUM CHLORIDE, SODIUM LACTATE AND CALCIUM CHLORIDE: 600; 310; 30; 20 INJECTION, SOLUTION INTRAVENOUS at 11:30

## 2017-04-18 RX ADMIN — FENTANYL CITRATE 50 MCG: 50 INJECTION, SOLUTION INTRAMUSCULAR; INTRAVENOUS at 11:36

## 2017-04-18 RX ADMIN — HYDROMORPHONE HYDROCHLORIDE 0.5 MG: 1 INJECTION, SOLUTION INTRAMUSCULAR; INTRAVENOUS; SUBCUTANEOUS at 20:15

## 2017-04-18 RX ADMIN — HYDROMORPHONE HYDROCHLORIDE 0.5 MG: 1 INJECTION, SOLUTION INTRAMUSCULAR; INTRAVENOUS; SUBCUTANEOUS at 23:07

## 2017-04-18 RX ADMIN — FENTANYL CITRATE 50 MCG: 50 INJECTION INTRAMUSCULAR; INTRAVENOUS at 12:28

## 2017-04-18 RX ADMIN — PANTOPRAZOLE SODIUM 40 MG: 40 TABLET, DELAYED RELEASE ORAL at 06:01

## 2017-04-18 RX ADMIN — OXYCODONE AND ACETAMINOPHEN 1 TABLET: 5; 325 TABLET ORAL at 06:13

## 2017-04-18 RX ADMIN — SODIUM CHLORIDE, POTASSIUM CHLORIDE, SODIUM LACTATE AND CALCIUM CHLORIDE 9 ML/HR: 600; 310; 30; 20 INJECTION, SOLUTION INTRAVENOUS at 20:43

## 2017-04-18 RX ADMIN — HYDROMORPHONE HYDROCHLORIDE 0.5 MG: 1 INJECTION, SOLUTION INTRAMUSCULAR; INTRAVENOUS; SUBCUTANEOUS at 14:31

## 2017-04-18 RX ADMIN — FENTANYL CITRATE 50 MCG: 50 INJECTION INTRAMUSCULAR; INTRAVENOUS at 12:41

## 2017-04-18 NOTE — PLAN OF CARE
Problem: Perioperative Period (Adult)  Goal: Signs and Symptoms of Listed Potential Problems Will be Absent or Manageable (Perioperative Period)  Outcome: Ongoing (interventions implemented as appropriate)    04/18/17 0866   Perioperative Period   Problems Assessed (Perioperative Period) all   Problems Present (Perioperative Period) none

## 2017-04-18 NOTE — OP NOTE
Preoperative diagnosis: Cervical myelopathy  Postoperative diagnosis: Same     Indication for procedure: This is a 56-year-old woman who presented to the emergency department with chief complaints of gait instability, spasticity, and discoordination that had been progressive over the course of several months.  She suffered a fall several weeks ago which caused a precipitous worsening of her symptoms.  She ultimately presented to the emergency department when she could no longer uses hand due to the spasticity and his coordination of her hand.  She was evaluated with a MRI of the cervical spine which demonstrated a high-grade stenosis at C3 4 as well as prolonged T2 signal within the spinal cord parenchyma consistent with a spinal cord injury.  Due to her progressive symptoms and radiographic and clinical evidence of cervical myelopathy, urgent operative intervention is indicated.     Informed consent for this procedure was obtained from the patient was obtained.  She acknowledges a risk of spinal cord injury, paralysis, weakness, loss of sensation, permanent neurologic deficit, bleeding, infection, iatrogenic instability, failure of benefit of the operation, or need for additional procedures. All questions were answered. No guarantees were given or implied. The patient elects proceed with surgery.     Procedure in detail: The patient was identified in the preoperative holding area and brought to the operating suite where she underwent the uneventful induction general endotracheal anesthesia. Venodyne's were placed by the nursing staff. The patient was placed in the Lang head fisher with 3 points of fixation to 60 pounds of pressure.  She was then gently rotated to the prone position on gel rolls. Pressure points were inspected and appropriately padded. The patient's posterior cervical spine was then shaved, prepped and draped in the usual sterile fashion. A timeout was performed. Intravenous and buttocks were  administered. A midline, vertically oriented skin incision was then made using a #15 blade after anesthetizing the skin. Hemostasis was achieved using monopolar cautery. Self-retaining retractors were placed and sequentially advanced. The fascia was opened and the soft tissue was then reflected off of the spinous process and lamina at C3 4. Dissection was carried out laterally to expose the lateral masses at C3 and C4 bilaterally. Using the Magerl technique, a total of 3 screws were placed in the lateral masses at C3 bilaterally C4 on the right measuring 3.5 x 16 mm. patient had a very small lateral mass at C4 on the left side, so he could only place a 3.5 x 10 mm screw at this level.  3.5 mm rods were then cut to the appropriate length and locked into place using the setscrews according to the 's specifications. PA and lateral fluoroscopy demonstrated satisfactory position of the implanted hardware and confirm the operative level.     The high-speed drill was then used to drill troughs in the lamina at C3 and C4 bilaterally. A full laminectomy at C3 and C4 were then carried out using the double-action rongeurs. The harvested autograft was mixed with 2.5 cc of DBM. The laminectomy was widened out using the Kerrison rongeurs. Epidural oozing was controlled using thrombin foam. The facet joints at C3 4 with and drilled out using the high-speed drill. The lateral masses and facet joints were then packed with the allograft/autograft slurry. Prior to placing the autograft and allograft, the wound was copiously irrigated with bacitracin saline.     A 7 flat JESSICA drain was left in the subfascial space and tunneled out through separate stab incision. The fascia and skin were then closed in layers. Glue and sterile dressing were applied.     Sponge, instruments, and needle counts were all correct at the conclusion of the case. I performed a procedure with the assistance of Mica Wynn, physician assistant.

## 2017-04-18 NOTE — BRIEF OP NOTE
CERVICAL LAMINECTOMY DECOMPRESSION POSTERIOR  Procedure Note    Maribel López  4/17/2017 - 4/18/2017    Pre-op Diagnosis:   Cervical spondylosis with myelopathy [M47.12]    Post-op Diagnosis:     Post-Op Diagnosis Codes:     * Cervical spondylosis with myelopathy [M47.12]    Procedure/CPT® Codes:  AR LAMINEC/FACETECT/FORAMIN,CERVICAL 1 SEG [75764]  AR POSTERIOR NON-SEGMENTAL INSTRUMENTATION [35677]    Procedure(s):  C3-4 CERVICAL LAMINECTOMY DECOMPRESSION POSTERIOR fusion     Surgeon(s):  Ever Garner MD    Anesthesia: General    Staff:   Circulator: Lyric Mathias RN; Lyric Hilliard RN  Radiology Technologist: Jose Rafael Rogers RT  Scrub Person: Liliya Anderson  Vendor Representative: Viet Mejia  Nursing Assistant: Bee Balderas    Estimated Blood Loss: 50 mL  Urine Voided: 0 mL    Specimens:                * No specimens in log *      Drains:   Drain/Device Site 04/18/17 1034 posterior neck collapsible closed device (Active)           Findings: Satisfactory decompression following C3 and C4 laminectomy.  Intraoperative fluoroscopy demonstrated satisfactory position of the implanted hardware and confirmed the operative levels.    Complications: None      Ever Garner MD     Date: 4/18/2017  Time: 11:43 AM

## 2017-04-18 NOTE — ANESTHESIA PROCEDURE NOTES
Airway  Airway not difficult    General Information and Staff    Patient location during procedure: OR  CRNA: LUIS TILLMAN    Indications and Patient Condition  Indications for airway management: airway protection    Preoxygenated: yes  MILS not maintained throughout  Mask difficulty assessment: 1 - vent by mask    Final Airway Details  Final airway type: endotracheal airway      Successful airway: ETT  Cuffed: yes   Successful intubation technique: video laryngoscopy  Facilitating devices/methods: intubating stylet  Endotracheal tube insertion site: oral  Blade: Ankit  Blade size: #4  ETT size: 7.5 mm  Cormack-Lehane Classification: grade I - full view of glottis  Placement verified by: chest auscultation and capnometry   Measured from: lips  ETT to lips (cm): 20  Number of attempts at approach: 1    Additional Comments  Negative epigastric sounds, Breath sound equal bilaterally with symmetric chest rise and fall.  Teeth intact.  DL with glidescope.  Maintain inline stabillization.

## 2017-04-18 NOTE — PLAN OF CARE
Problem: Laminectomy/Foraminotomy/Discectomy (Adult)  Goal: Signs and Symptoms of Listed Potential Problems Will be Absent or Manageable (Laminectomy/Foraminotomy/Discectomy)  Outcome: Ongoing (interventions implemented as appropriate)    Problem: Pain, Acute (Adult)  Goal: Identify Related Risk Factors and Signs and Symptoms  Outcome: Ongoing (interventions implemented as appropriate)  Goal: Acceptable Pain Control/Comfort Level  Outcome: Ongoing (interventions implemented as appropriate)    Problem: Fall Risk (Adult)  Goal: Identify Related Risk Factors and Signs and Symptoms  Outcome: Ongoing (interventions implemented as appropriate)  Goal: Absence of Falls  Outcome: Ongoing (interventions implemented as appropriate)

## 2017-04-18 NOTE — ANESTHESIA POSTPROCEDURE EVALUATION
Patient: Maribel López    Procedure Summary     Date Anesthesia Start Anesthesia Stop Room / Location    04/18/17 0953 1154 [facility] / [facility]       Procedure Diagnosis Surgeon Provider    C3-4 CERVICAL LAMINECTOMY DECOMPRESSION POSTERIOR fusion  (N/A Spine Cervical) Cervical spondylosis with myelopathy  (Cervical spondylosis with myelopathy [M47.12]) MD Gian Albarado MD          Anesthesia Type: general  Last vitals  BP      Temp      Pulse     Resp      SpO2        Post Anesthesia Care and Evaluation    Patient location during evaluation: PACU  Patient participation: complete - patient participated  Level of consciousness: awake and alert  Pain score: 0  Pain management: adequate  Airway patency: patent  Anesthetic complications: No anesthetic complications  PONV Status: none  Cardiovascular status: hemodynamically stable and acceptable  Respiratory status: nonlabored ventilation, acceptable and nasal cannula  Hydration status: acceptable

## 2017-04-18 NOTE — PROGRESS NOTES
"    [facility] Medicine Services  INPATIENT PROGRESS NOTE    Date of Admission: 4/17/2017  Length of Stay: 1  Primary Care Physician: Asael Lockhart MD    Subjective   CC:neck pain, weakness    HPI:  Seen directly after back from PACU.  Says she feels \"shitty\".  Just got some pain medicine.  Can't tell a difference in strength yet.    Review Of Systems:   Review of Systems   Constitutional: Negative for fever.   Respiratory: Negative for shortness of breath.    Cardiovascular: Negative for chest pain and leg swelling.   Gastrointestinal: Negative for abdominal pain.   Musculoskeletal: Positive for neck pain.       Objective      Temp:  [97.5 °F (36.4 °C)-98.6 °F (37 °C)] 97.9 °F (36.6 °C)  Heart Rate:  [55-77] 63  Resp:  [16-18] 18  BP: (112-196)/(44-99) 143/68  Physical Exam   Constitutional: She is oriented to person, place, and time. She appears well-developed and well-nourished.   Appears uncomfortable   HENT:   Head: Normocephalic and atraumatic.   Eyes: Pupils are equal, round, and reactive to light.   Cardiovascular: Normal rate, regular rhythm and normal heart sounds.    No murmur heard.  Pulmonary/Chest: Effort normal and breath sounds normal.   Abdominal: Soft. Bowel sounds are normal. She exhibits no distension.   Musculoskeletal: She exhibits no edema.   Neck bandage clean, drain in place   Neurological: She is alert and oriented to person, place, and time.   Skin: Skin is warm and dry. No rash noted.   Psychiatric: She has a normal mood and affect.   Vitals reviewed.        Results Review:    I have reviewed the labs, radiology results and diagnostic studies.      Results from last 7 days  Lab Units 04/18/17  0449   WBC 10*3/mm3 6.72   HEMOGLOBIN g/dL 12.1   PLATELETS 10*3/mm3 429       Results from last 7 days  Lab Units 04/18/17  0449   SODIUM mmol/L 138   POTASSIUM mmol/L 3.5   CHLORIDE mmol/L 101   TOTAL CO2 mmol/L 29.0   BUN mg/dL 8*   CREATININE mg/dL 0.80   GLUCOSE " mg/dL 86   CALCIUM mg/dL 10.2     Radiology Data:   MRI - Abnormal signal intensity within the spinal cord with severe  central spinal canal stenosis at the C3-C4 level. Large left paracentral  disc protrusion creating severe central spinal canal stenosis and  narrowing of the left neuroforamina. Compromise is again seen with edema in the spinal cord. There is narrowing as well seen at the C5-C6 and C6-C7 levels with mass effect on the spinal cord. Minimal cord change is seen at the C5-C6 level posteriorly    I have reviewed the medications.    Assessment/Plan     Problem List  Hospital Problem List     * (Principal)Cervical spondylosis with myelopathy    Spinal stenosis of cervical region    Hypertension    GERD (gastroesophageal reflux disease)    Arthritis    Depression        Assessment/Plan:    - POD #0 c3/4 laminectomy and posterior fusion  - cunningham control per Dr. Garner  - watch BP, on home meds  - nicotine patch ordered, counseled and she would like patches to go home with  - doing well immeadiately post-op.  Home when ok with NS.    Anthony Roman MD   04/18/17   1:40 PM    Please note that portions of this note may have been completed with a voice recognition program. Efforts were made to edit the dictations, but occasionally words are mistranscribed.

## 2017-04-18 NOTE — PROGRESS NOTES
Discharge Planning Assessment  [facility]     Patient Name: Maribel López  MRN: [id number]  Today's Date: 4/18/2017    Admit Date: 4/17/2017          Discharge Needs Assessment       04/18/17 1458    Living Environment    Lives With significant other    Living Arrangements condominium    Provides Primary Care For no one    Quality Of Family Relationships supportive    Able to Return to Prior Living Arrangements yes    Discharge Needs Assessment    Concerns To Be Addressed no discharge needs identified;denies needs/concerns at this time    Equipment Currently Used at Home none    Equipment Needed After Discharge walker, rolling    Transportation Available car;family or friend will provide            Discharge Plan       04/18/17 2725    Case Management/Social Work Plan    Plan Home    Patient/Family In Agreement With Plan yes    Additional Comments Spoke with patient at bedside. She lives with her SO in a first floor condo in [facility]. She is independent with ADL's and mobility. She has borrowed a SW from a friend but would like a RW. A referral has been made to [facility] per patient request and Milena will deliver it to patient's room.  Family will tranpsort at [place]. Goal is to return home. CM will follow.         Discharge Placement     No information found                Demographic Summary       04/18/17 4190    Referral Information    Arrived From home or self-care    Reason For Consult discharge planning    Primary Care Physician Information    Name Asael Riderx            Functional Status       04/18/17 1457    Functional Status Prior    Ambulation 0-->independent    Transferring 0-->independent    Toileting 0-->independent    Bathing 0-->independent    Dressing 0-->independent    Eating 0-->independent    Communication 0-->understands/communicates without difficulty    Swallowing 0-->swallows foods/liquids without difficulty    Activity Tolerance    Usual Activity Tolerance good    Current Activity  Tolerance moderate            Psychosocial     None            Abuse/Neglect     None            Legal     None            Substance Abuse     None            Patient Forms     None          Bridgette Gallardo, DEBRA

## 2017-04-18 NOTE — ANESTHESIA PREPROCEDURE EVALUATION
" Anesthesia Evaluation     Patient summary reviewed and Nursing notes reviewed   NPO Status: > 8 hours   Airway   Mallampati: II  TM distance: >3 FB  Neck ROM: limited  possible difficult intubation  Dental      Pulmonary    (+) a smoker (PRN MDI Stable) Current, COPD moderate,   Cardiovascular     (+) hypertension (Takes HCTZ for \"fluid\"),       Neuro/Psych  (+) psychiatric history (cymbalta),    GI/Hepatic/Renal/Endo    (+)  GERD,     Musculoskeletal     Abdominal    Substance History      OB/GYN          Other   (+) arthritis                               Anesthesia Plan    ASA 3     general   (In-line stabilisation   positioin comfort prior   Glidescope SB  )  intravenous induction   Anesthetic plan and risks discussed with patient.    Plan discussed with CRNA.      "

## 2017-04-19 LAB
COPPER SERPL-MCNC: 174 UG/DL (ref 72–166)
METHYLMALONATE SERPL-SCNC: 786 NMOL/L (ref 0–378)
VIT B1 BLD-SCNC: 102.2 NMOL/L (ref 66.5–200)
VIT B6 SERPL-MCNC: 5.9 UG/L (ref 2–32.8)

## 2017-04-19 PROCEDURE — G0378 HOSPITAL OBSERVATION PER HR: HCPCS

## 2017-04-19 PROCEDURE — 25010000002 HYDROMORPHONE PER 4 MG: Performed by: NEUROLOGICAL SURGERY

## 2017-04-19 PROCEDURE — 97162 PT EVAL MOD COMPLEX 30 MIN: CPT

## 2017-04-19 PROCEDURE — 25010000002 KETOROLAC TROMETHAMINE PER 15 MG: Performed by: NEUROLOGICAL SURGERY

## 2017-04-19 PROCEDURE — 97166 OT EVAL MOD COMPLEX 45 MIN: CPT

## 2017-04-19 PROCEDURE — 99024 POSTOP FOLLOW-UP VISIT: CPT | Performed by: PHYSICIAN ASSISTANT

## 2017-04-19 PROCEDURE — 99232 SBSQ HOSP IP/OBS MODERATE 35: CPT | Performed by: NURSE PRACTITIONER

## 2017-04-19 PROCEDURE — 25010000002 DIAZEPAM PER 5 MG: Performed by: NEUROLOGICAL SURGERY

## 2017-04-19 PROCEDURE — 94799 UNLISTED PULMONARY SVC/PX: CPT

## 2017-04-19 PROCEDURE — 97530 THERAPEUTIC ACTIVITIES: CPT

## 2017-04-19 PROCEDURE — 25010000002 ENOXAPARIN PER 10 MG: Performed by: NEUROLOGICAL SURGERY

## 2017-04-19 RX ADMIN — ENOXAPARIN SODIUM 40 MG: 40 INJECTION SUBCUTANEOUS at 09:04

## 2017-04-19 RX ADMIN — DOCUSATE SODIUM 100 MG: 100 CAPSULE, LIQUID FILLED ORAL at 09:03

## 2017-04-19 RX ADMIN — DIAZEPAM 5 MG: 5 INJECTION, SOLUTION INTRAMUSCULAR; INTRAVENOUS at 05:11

## 2017-04-19 RX ADMIN — DOCUSATE SODIUM 100 MG: 100 CAPSULE, LIQUID FILLED ORAL at 17:36

## 2017-04-19 RX ADMIN — LORAZEPAM 0.5 MG: 0.5 TABLET ORAL at 13:07

## 2017-04-19 RX ADMIN — HYDROMORPHONE HYDROCHLORIDE 0.5 MG: 1 INJECTION, SOLUTION INTRAMUSCULAR; INTRAVENOUS; SUBCUTANEOUS at 04:50

## 2017-04-19 RX ADMIN — TRIAMTERENE AND HYDROCHLOROTHIAZIDE 1 TABLET: 37.5; 25 TABLET ORAL at 09:03

## 2017-04-19 RX ADMIN — KETOROLAC TROMETHAMINE 15 MG: 15 INJECTION, SOLUTION INTRAMUSCULAR; INTRAVENOUS at 12:59

## 2017-04-19 RX ADMIN — HYDROCODONE BITARTRATE AND ACETAMINOPHEN 1 TABLET: 5; 325 TABLET ORAL at 20:20

## 2017-04-19 RX ADMIN — PANTOPRAZOLE SODIUM 40 MG: 40 TABLET, DELAYED RELEASE ORAL at 05:12

## 2017-04-19 RX ADMIN — OXYCODONE AND ACETAMINOPHEN 1 TABLET: 5; 325 TABLET ORAL at 17:36

## 2017-04-19 RX ADMIN — NICOTINE 1 PATCH: 21 PATCH, EXTENDED RELEASE TRANSDERMAL at 09:04

## 2017-04-19 RX ADMIN — OXYCODONE AND ACETAMINOPHEN 1 TABLET: 5; 325 TABLET ORAL at 09:13

## 2017-04-19 RX ADMIN — OXYCODONE AND ACETAMINOPHEN 1 TABLET: 5; 325 TABLET ORAL at 03:15

## 2017-04-19 RX ADMIN — DULOXETINE 120 MG: 60 CAPSULE, DELAYED RELEASE ORAL at 09:03

## 2017-04-19 NOTE — PROGRESS NOTES
"NEUROSURGERY PROGRESS NOTE    Interval History:   Ms. López is a 55yo F with a 1year history of progressive cervical myelopathy symptoms.  She underwent a C3-4 posterior cervical laminectomy and fusion yesterday, 4/18/17.    Today, she reports that she has better feeling in her hands and fingers.  She was able to hold a pen and write this morning, which is an improvement.  She has a bit better feeling in her feet as well, per her report.  She has been getting up to bedside commode.  She is still quite unsteady on her feet and a fall risk. PT has worked with her this morning and is trying to get her a specific walker that will help her with this.   Her incisional pain has been moderate, and is controlled with oral medication. She has not had a BM, but has been voiding regularly without any accidents overnight   We spoke at some length about discharge planning.  She is not able to drive currently and will need a few days of inpatient therapy vs. Home physical therapy for some time.      Vital Signs  Blood pressure 158/83, pulse 69, temperature 97.6 °F (36.4 °C), temperature source Tympanic, resp. rate 16, height 60\" (152.4 cm), weight 125 lb (56.7 kg), SpO2 96 %.    Physical Exam:  Pt is awake, alert, and oriented x3.  Incision is clean, dry, and intact.  UE strength is 4/5 bilaterally.   strength is still 3-4/5 bilaterally.  LE strength 3-4/5.  She remains hyper reflexive with clonus and spasticity.       Results Review:      Results from last 7 days  Lab Units 04/18/17  0449 04/17/17  1127   WBC 10*3/mm3 6.72 9.12   HEMOGLOBIN g/dL 12.1 12.8   HEMATOCRIT % 36.9 38.7   PLATELETS 10*3/mm3 429 448         Results from last 7 days  Lab Units 04/18/17  0449 04/17/17  1127   SODIUM mmol/L 138 138   POTASSIUM mmol/L 3.5 3.7   CHLORIDE mmol/L 101 101   TOTAL CO2 mmol/L 29.0 34.0*   BUN mg/dL 8* 7*   CREATININE mg/dL 0.80 0.80   GLUCOSE mg/dL 86 75   CALCIUM mg/dL 10.2 10.6*       I/O last 3 completed shifts:  In: " 2509 [I.V.:2509]  Out: 3035 [Urine:2900; Other:85; Blood:50]  I/O this shift:  In: 120 [P.O.:120]  Out: 250 [Urine:250]    Total JESSICA output overnight is <200ml    ASSESSMENT/PLAN:  Ms. López is doing well.  She has noticed some mild improvement in her myelopathy symptoms, though she still has a spastic gait and is a fall risk.  She will need assessment with OT and Case management today to determine the best course for her going forward.   We will remove her JESSICA drain today. Discharge when planning is complete. Will need neurosurgery follow up in 2 weeks with [facility]. Off work at least until this follow up.      Mica Wynn PA-C  04/19/17  10:27 AM

## 2017-04-19 NOTE — PROGRESS NOTES
Acute Care - Occupational Therapy Initial Evaluation  [facility]     Patient Name: Maribel López  : 1960  MRN: [id number]  Today's Date: 2017  Onset of Illness/Injury or Date of Surgery Date: 17  Date of Referral to OT: 17  Referring Physician: Espinoza Garner    Admit Date: 2017       ICD-10-CM ICD-9-CM   1. Cervical spondylosis with myelopathy M47.12 721.1   2. Spinal stenosis of cervical region M48.02 723.0   3. Gait disturbance R26.9 781.2   4. Paresthesia of upper and lower extremities of both sides R20.2 782.0   5. Impaired mobility and ADLs Z74.09 799.89   6. Impaired functional mobility, balance, gait, and endurance Z74.09 V49.89     Patient Active Problem List   Diagnosis   • Hypertension   • GERD (gastroesophageal reflux disease)   • Arthritis   • Cervical spondylosis with myelopathy   • Depression   • Spinal stenosis of cervical region   • Tobacco abuse     Past Medical History:   Diagnosis Date   • Arthritis    • Depression    • GERD (gastroesophageal reflux disease)    • Hypertension      Past Surgical History:   Procedure Laterality Date   • CERVICAL LAMINECTOMY DECOMPRESSION POSTERIOR N/A 2017    Procedure: C3-4 CERVICAL LAMINECTOMY DECOMPRESSION POSTERIOR fusion ;  Surgeon: Ever Garner MD;  Location: [facility];  Service:    • CHOLECYSTECTOMY     • DILATATION AND CURETTAGE     • TUBAL ABDOMINAL LIGATION  2004    [facility]          OT ASSESSMENT FLOWSHEET (last 72 hours)      OT Evaluation       17 0820 17 0819 17 1458 17 1457 17 1805    Rehab Evaluation    Document Type evaluation  -SC evaluation  -EL       Subjective Information agree to therapy  -SC agree to therapy;complains of;pain;weakness  -EL       Patient Effort, Rehab Treatment excellent  -SC excellent  -EL       Symptoms Noted During/After Treatment  fatigue  -EL       General Information    Patient Profile Review yes  -SC yes  -EL       Onset of Illness/Injury or  Date of Surgery Date 04/17/17  -SC 04/18/17  -EL       Referring Physician DFr Pascual  -SC MD Pascual  -EL       General Observations in bed, drain intact  -SC Pt supine in bed on arrival, IV L UE, JESSICA drain  -EL       Pertinent History Of Current Problem 1 year hx of progressive weakness in arms and legs, S/P C34 post. fusion /lami for central stenosis with disc protrusion  -SC To ED with progressively worsening weakness all four extremities, gait instability, bilat hand numbness, neck pain, spasms, instances of incontinence, and multiple falls at home; symptoms x1 year, worsening x1 month. Pt is POD #1 C3-4 lami, decompression, posterior fusion for high-grade stenosis C3-4.   -EL       Precautions/Limitations fall precautions   recent falls  -SC fall precautions;spinal precautions   cervical precautions  -EL       Prior Level of Function all household mobility   walks holding onto furniture, falls backwards  -SC min assist:;community mobility;all household mobility;transfer;ADL's;home management;driving;shopping   pain increased with activity; gait instability   -EL       Equipment Currently Used at Home cane, straight;shower chair;walker, standard  -SC cane, straight;shower chair;walker, standard   was not using any DME PTA   -EL none  -MH  none  -EK    Plans/Goals Discussed With patient;agreed upon  -SC patient;agreed upon  -       Risks Reviewed patient:;increased discomfort  -SC patient:;increased discomfort  -EL       Benefits Reviewed patient:;increase independence;improve function  -SC patient:;improve function;increase independence  -EL       Barriers to Rehab none identified  -SC previous functional deficit  -EL       Living Environment    Lives With significant other  -SC significant other  -EL significant other  -  significant other  -EK    Living Arrangements condominium  -SC condominium  -EL condominium  -  condominium  -EK    Home Accessibility no concerns  -SC tub/shower is not walk in;stairs  to enter home;bed and bath on same level  -EL   no concerns  -EK    Number of Stairs to Enter Home  1  -EL       Stair Railings at Home     none  -EK    Type of Financial/Environmental Concern     none  -EK    Transportation Available   car;family or friend will provide  -  none  -EK    Living Environment Comment  pt reports she has S.O. and neighbors who are available for caregiving assistance   -EL       Clinical Impression    Date of Referral to OT  04/18/17  -EL       OT Diagnosis  decreased independence with ADLs   -EL       Impairments Found (describe specific impairments)  gait, locomotion, and balance  -EL       Patient/Family Goals Statement  return home   -EL       Criteria for Skilled Therapeutic Interventions Met  yes;treatment indicated  -EL       Rehab Potential  good, to achieve stated therapy goals  -EL       Therapy Frequency  daily  -EL       Anticipated Equipment Needs At Discharge  bathing equipment;reacher;dressing equipment;rollator   issued AE for LB ADL this date   -EL       Anticipated Discharge Disposition  inpatient rehabilitation facility;home with home health   would benefit from inpt rehab; requesting home with    -EL       Functional Level Prior    Ambulation    0-->independent  -MH 0-->independent  -EK    Transferring    0-->independent  -MH 0-->independent  -EK    Toileting    0-->independent  -MH 0-->independent  -EK    Bathing    0-->independent  - 0-->independent  -EK    Dressing    0-->independent  - 2-->assistive person  -EK    Eating    0-->independent  -MH 0-->independent  -EK    Communication    0-->understands/communicates without difficulty  -MH 0-->understands/communicates without difficulty  -EK    Swallowing    0-->swallows foods/liquids without difficulty  -MH 0-->swallows foods/liquids without difficulty  -EK    Vital Signs    Pre Systolic BP Rehab  --   vitals stable throughout; nsg consent for treatment   -       Pain Assessment    Pain Assessment 0-10  -SC  0-10  -       Pain Score 5  -SC 5  -EL       Post Pain Score 5  -SC 5  -EL       Pain Type Acute pain  -SC Acute pain  -EL       Pain Location Neck  -SC Neck  -       Pain Intervention(s) Repositioned  -SC Repositioned;Ambulation/increased activity  -       Response to Interventions  tolerated   -EL       Vision Assessment/Intervention    Visual Impairment  WFL  -       Cognitive Assessment/Intervention    Current Cognitive/Communication Assessment functional  -SC functional  -       Orientation Status oriented x 4  -SC oriented x 4  -       Follows Commands/Answers Questions 100% of the time  -SC able to follow single-step instructions;100% of the time  -       Personal Safety WNL/WFL  -SC good awareness, safety precautions  -       Personal Safety Interventions gait belt;fall prevention program maintained  -SC fall prevention program maintained;gait belt;muscle strengthening facilitated;nonskid shoes/slippers when out of bed  -       ROM (Range of Motion)    General ROM other (see comments)   neck limited 50%  -SC upper extremity range of motion deficits identified  -       General ROM Detail  B shoulders approx 25% limited, remaining B UE WFL  -       MMT (Manual Muscle Testing)    General MMT Assessment upper extremity strength deficits identified;lower extremity strength deficits identified  -SC upper extremity strength deficits identified  -       General MMT Assessment Detail  shoulders deferred; remaining L UE grossly 3+/5; remaining R UE grossly 4/5  -       Upper Extremity    Upper Ext Manual Muscle Testing Detail L hands 3+/5  -SC        Muscle Tone Assessment    Muscle Tone Assessment Bilateral Lower Extremities   +CLONUS  -SC        Bed Mobility, Assessment/Treatment    Bed Mobility, Assistive Device bed rails;head of bed elevated  -SC bed rails;head of bed elevated  -       Bed Mob, Supine to Sit, [place] conditional independence  -SC contact guard assist;verbal cues  required  -EL       Bed Mobility, Impairments  strength decreased;impaired balance  -EL       Bed Mobility, Comment  VC for sequencing   -EL       Transfer Assessment/Treatment    Transfers, Sit-Stand [place] contact guard assist  -SC contact guard assist;verbal cues required  -EL       Transfers, Stand-Sit [place] contact guard assist  -SC contact guard assist;verbal cues required  -EL       Transfers, Sit-Stand-Sit, Assist Device rolling walker  -SC rolling walker  -EL       Toilet Transfer, [place]  minimum assist (75% patient effort);verbal cues required  -EL       Toilet Transfer, Assistive Device  --   B UE support   -EL       Transfer, Safety Issues loses balance backward  -SC step length decreased;weight-shifting ability decreased  -EL       Transfer, Impairments impaired balance  -SC strength decreased;impaired balance  -EL       Transfer, Comment cues for safety and hand placement  -SC VC for hand placement and sequencing steps to [facility]   -       Functional Mobility    Functional Mobility- Ind. Level  minimum assist (75% patient effort);verbal cues required  -EL       Functional Mobility- Device  rolling walker  -EL       Functional Mobility-Distance (Feet)  --   in hallway  -EL       Functional Mobility- Safety Issues  balance decreased during turns;step length decreased;weight-shifting ability decreased  -EL       Functional Mobility- Comment  pt with LE spasms causing difficulty with mobility; required min assist at times to guide RW; increased time to complete   -EL       Lower Body Bathing Assessment/Training    LB Bathing Assess/Train, Comment  issued LH sponge and educated on use   -EL       Lower Body Dressing Assessment/Training    LB Dressing Assess/Train, Clothing Type  donning:;slipper socks  -EL       LB Dressing Assess/Train, Position  sitting;edge of bed  -EL       LB Dressing Assess/Train, [place]  supervision required;verbal cues required  -EL       LB Dressing  Assess/Train, Impairments  ROM decreased;strength decreased;impaired balance  -       LB Dressing Assess/Train, Comment  educated on adaptive technique for completing LB dressing in sitting; issued shoe horn and reacher    -       Motor Skills/Interventions    Additional Documentation  Balance Skills Training (Group)  -       Balance Skills Training    Sitting-Level of Assistance  Close supervision  -       Sitting-Balance Support  Feet supported;Right upper extremity supported;Left upper extremity supported  -       Sitting-Balance Activities  Trunk control activities;Reaching for objects  -       Standing-Level of Assistance  Contact guard  -       Static Standing Balance Support  assistive device  -       Standing-Balance Activities  Weight Shift A-P;Weight Shift R-L  -EL       Gait Balance-Level of Assistance  Minimum assistance  -EL       Gait Balance Support  assistive device  -       Gait Balance Activities  scanning environment R/L;side-stepping  -       Therapy Exercises    Bilateral Upper Extremity 10 reps;AROM:;sitting;shoulder abduction/adduction   rowing, neck gentle flexion/rotation  -SC        General Therapy Interventions    Planned Therapy Interventions  activity intolerance;adaptive equipment training;ADL retraining;balance training;bed mobility training;home exercise program;transfer training  -       Positioning and Restraints    Pre-Treatment Position in bed  -SC in bed  -EL       Post Treatment Position chair  -SC chair  -EL       In Chair sitting;reclined;notified nsg;call light within reach;encouraged to call for assist;exit alarm on   neck roll issued for comfort  -SC notified nsg;reclined;sitting;call light within reach;encouraged to call for assist;exit alarm on;legs elevated   with neck roll for comfort   -EL       Lower Extremity    Lower Ext Manual Muscle Testing Detail L tib ant 3+/5  -SC          User Key  (r) = Recorded By, (t) = Taken By, (c) = Cosigned By     Initials Name Effective Dates    SC Rosa Maria Pan, PT 06/19/15 -      Bridgette Gallardo, DEBRA 03/14/16 -     EK Arielle Machado RN 07/13/16 -     EL Stefania Bose, OT 06/08/16 -            Occupational Therapy Education     Title: PT OT SLP Therapies (Done)     Topic: Occupational Therapy (Done)     Point: ADL training (Done)    Description: Instruct learner(s) on proper safety adaptation and remediation techniques during self care or transfers.   Instruct in proper use of assistive devices.    Learning Progress Summary    Learner Readiness Method Response Comment Documented by Status   Patient Acceptance E VU,NR Educated on safety for transfers, cervical precautions, and adaptive techniques for LB ADL with AE.  04/19/17 1125 Done               Point: Precautions (Done)    Description: Instruct learner(s) on prescribed precautions during self-care and functional transfers.    Learning Progress Summary    Learner Readiness Method Response Comment Documented by Status   Patient Acceptance E VU,NR Educated on safety for transfers, cervical precautions, and adaptive techniques for LB ADL with AE. EL 04/19/17 1125 Done               Point: Body mechanics (Done)    Description: Instruct learner(s) on proper positioning and spine alignment during self-care, functional mobility activities and/or exercises.    Learning Progress Summary    Learner Readiness Method Response Comment Documented by Status   Patient Acceptance E VU,NR Educated on safety for transfers, cervical precautions, and adaptive techniques for LB ADL with AE. EL 04/19/17 1125 Done                      User Key     Initials Effective Dates Name Provider Type Discipline    EL 06/08/16 -  Stefania Bose, OT Occupational Therapist OT                  OT Recommendation and Plan  Anticipated Equipment Needs At Discharge: bathing equipment, reacher, dressing equipment, rollator (issued AE for LB ADL this date )  Anticipated Discharge Disposition: inpatient  rehabilitation facility, home with home health (would benefit from inpt rehab; requesting home with HH )  Planned Therapy Interventions: activity intolerance, adaptive equipment training, ADL retraining, balance training, bed mobility training, home exercise program, transfer training  Therapy Frequency: daily  Plan of Care Review  Plan Of Care Reviewed With: patient  Progress: progress toward functional goals as expected  Outcome Summary/Follow up Plan: MELLISSA zhang completed this date. Pt demo transfers and mobility with min assist for RW guidance. Presents with LE spasms affecting mobility and standing balance. Recommend inpatient rehab upon D/C.           OT Goals       04/19/17 1126          Bed Mobility OT LTG    Bed Mobility OT LTG, Date Established 04/19/17  -EL      Bed Mobility OT LTG, Time to Achieve by discharge  -EL      Bed Mobility OT LTG, Activity Type supine to sit/sit to supine  -EL      Bed Mobility OT LTG, [place] Level conditional independence  -EL      Transfer Training OT LTG    Transfer Training OT LTG, Date Established 04/19/17  -EL      Transfer Training OT LTG, Time to Achieve by discharge  -EL      Transfer Training OT LTG, Activity Type sit to stand/stand to sit;toilet  -EL      Transfer Training OT LTG, [place] Level supervision required;verbal cues required  -EL      Transfer Training OT LTG, Assist Device walker, rolling;commode, bedside  -EL      Patient Education OT LTG    Patient Education OT LTG, Date Established 04/19/17  -EL      Patient Education OT LTG, Time to Achieve by discharge  -EL      Patient Education OT LTG, Education Type precautions per surgeon;posture/body mechanics;adaptive equipment mgmt;home safety  -EL      Patient Education OT LTG, Education Understanding demonstrates adequately;verbalizes understanding  -EL      LB Dressing OT LTG    LB Dressing Goal OT LTG, Date Established 04/19/17  -EL      LB Dressing Goal OT LTG, Time to Achieve by discharge  -EL       LB Dressing Goal OT LTG, Activity Type demo LB dressing task   -EL      LB Dressing Goal OT LTG, [place] Level conditional independence  -EL      LB Dressing Goal OT LTG, Adaptive Equipment reacher;shoe horn, long handled  -EL        User Key  (r) = Recorded By, (t) = Taken By, (c) = Cosigned By    Initials Name Provider Type    EL Stefania Bose, OT Occupational Therapist                Outcome Measures       04/19/17 0820 04/19/17 0819       How much help from another person do you currently need...    Turning from your back to your side while in flat bed without using bedrails? 4  -SC      Moving from lying on back to sitting on the side of a flat bed without bedrails? 3  -SC      Moving to and from a bed to a chair (including a wheelchair)? 3  -SC      Standing up from a chair using your arms (e.g., wheelchair, bedside chair)? 3  -SC      Climbing 3-5 steps with a railing? 3  -SC      To walk in hospital room? 3  -SC      AM-PAC 6 Clicks Score 19  -SC      How much help from another is currently needed...    Putting on and taking off regular lower body clothing?  3  -EL     Bathing (including washing, rinsing, and drying)  3  -EL     Toileting (which includes using toilet bed pan or urinal)  3  -EL     Putting on and taking off regular upper body clothing  3  -EL     Taking care of personal grooming (such as brushing teeth)  3  -EL     Eating meals  4  -EL     Score  19  -EL     Functional Assessment    Outcome Measure Options AM-PAC 6 Clicks Basic Mobility (PT)  -SC AM-PAC 6 Clicks Daily Activity (OT)  -EL       User Key  (r) = Recorded By, (t) = Taken By, (c) = Cosigned By    Initials Name Provider Type    SC Rosa Maria Pan, PT Physical Therapist    EL Stefania Bose, OT Occupational Therapist          Time Calculation:   OT Start Time: 0819    Therapy Charges for Today     Code Description Service Date Service Provider Modifiers Qty    24622121571  OT EVAL MOD COMPLEXITY 4 4/19/2017 Stefania Bose  OT GO 1    [id number]  OT THERAPEUTIC ACT EA 15 MIN 4/19/2017 Stefania Bose OT GO 1               Stefania Bose OT  4/19/2017

## 2017-04-19 NOTE — SIGNIFICANT NOTE
RN called to report HTN despite pain medications to control pain component. Appears we do have her home diuretic started but dose was held this AM for NPO for surgery. RN to have pharmacy give dose now and then can retime for tomorrow dose to earliest appropriate.

## 2017-04-19 NOTE — PLAN OF CARE
Problem: Patient Care Overview (Adult)  Goal: Plan of Care Review  Outcome: Ongoing (interventions implemented as appropriate)    04/19/17 0953   Coping/Psychosocial Response Interventions   Plan Of Care Reviewed With patient   Patient Care Overview   Progress improving   Outcome Evaluation   Outcome Summary/Follow up Plan Patient pod#1 s/p decompression/fusion C34. She presents with a spastic gait and impaired dynamic balance putting her at risk for falls. I would like to try a rollator walker with her to help with upright stability. I have recommended an inpatient rehab consult.          Problem: Laminectomy/Foraminotomy/Discectomy (Adult)  Goal: Signs and Symptoms of Listed Potential Problems Will be Absent or Manageable (Laminectomy/Foraminotomy/Discectomy)  Outcome: Ongoing (interventions implemented as appropriate)    04/19/17 0953   Laminectomy/Foraminotomy/Discectomy   Problems Assessed (Laminectomy/Laminotomy/Discectomy) pain   Problems Present (Laminectomy/Laminotomy/Discectomy) pain         Problem: Inpatient Physical Therapy  Goal: Bed Mobility Goal LTG- PT  Outcome: Ongoing (interventions implemented as appropriate)    04/19/17 0953   Bed Mobility PT LTG   Bed Mobility PT LTG, Date Established 04/19/17   Bed Mobility PT LTG, Activity Type all bed mobility   Bed Mobility PT LTG, [place] Level independent   Bed Mobility PT LTG, Outcome goal ongoing       Goal: Transfer Training Goal 1 LTG- PT  Outcome: Ongoing (interventions implemented as appropriate)    04/19/17 0953   Transfer Training PT LTG   Transfer Training PT LTG, Date Established 04/19/17   Transfer Training PT LTG, Time to Achieve 5 - 7 days   Transfer Training PT LTG, Activity Type sit to stand/stand to sit   Transfer Training PT LTG, [place] Level supervision required   Transfer Training PT LTG, Assist Device walker, rolling   Transfer Training PT LTG, Outcome goal ongoing       Goal: Gait Training Goal LTG- PT  Outcome: Ongoing  (interventions implemented as appropriate)    04/19/17 0953   Gait Training PT LTG   Gait Training Goal PT LTG, Date Established 04/19/17   Gait Training Goal PT LTG, Time to Achieve 5 - 7 days   Gait Training Goal PT LTG, [place] Level supervision required   Gait Training Goal PT LTG, Assist Device walker, rolling   Gait Training Goal PT LTG, Distance to Achieve 400   Gait Training Goal PT LTG, Outcome goal ongoing

## 2017-04-19 NOTE — PROGRESS NOTES
Continued Stay Note   [facility]     Patient Name: Maribel López  MRN: [id number]  Today's Date: 4/19/2017    Admit Date: 4/17/2017          Discharge Plan       04/19/17 1413    Case Management/Social Work Plan    Plan Home w/ HH    Patient/Family In Agreement With Plan yes    Additional Comments Spoke with patient at bedside regarding PT/OT's recommendation for STR at discharge. She is not agreeable at this time and prefers HH/PT/OT instead. Per patient request a referral has been made to [facility] and she is following. A RW was ordered for patient yesterday, per therapy recommendations that has been exchanged for a rollator through [facility]. Milena will deliver today to patient's room. CM will follow.               Discharge Codes     None            Bridgette Gallardo RN

## 2017-04-19 NOTE — PLAN OF CARE
Problem: Patient Care Overview (Adult)  Goal: Plan of Care Review  Outcome: Ongoing (interventions implemented as appropriate)    04/19/17 1126   Coping/Psychosocial Response Interventions   Plan Of Care Reviewed With patient   Patient Care Overview   Progress progress toward functional goals as expected   Outcome Evaluation   Outcome Summary/Follow up Plan MELLISSA zhang completed this date. Pt demo transfers and mobility with min assist for RW guidance. Presents with LE spasms affecting mobility and standing balance. Recommend inpatient rehab upon D/C.          Problem: Laminectomy/Foraminotomy/Discectomy (Adult)  Goal: Signs and Symptoms of Listed Potential Problems Will be Absent or Manageable (Laminectomy/Foraminotomy/Discectomy)  Outcome: Ongoing (interventions implemented as appropriate)    04/19/17 1126   Laminectomy/Foraminotomy/Discectomy   Problems Assessed (Laminectomy/Laminotomy/Discectomy) functional decline/self-care deficit   Problems Present (Laminectomy/Laminotomy/Discectomy) functional decline/self-care deficit         Problem: Inpatient Occupational Therapy  Goal: Bed Mobility Goal LTG- OT  Outcome: Ongoing (interventions implemented as appropriate)    04/19/17 1126   Bed Mobility OT LTG   Bed Mobility OT LTG, Date Established 04/19/17   Bed Mobility OT LTG, Time to Achieve by discharge   Bed Mobility OT LTG, Activity Type supine to sit/sit to supine   Bed Mobility OT LTG, [place] Level conditional independence       Goal: Transfer Training Goal 1 LTG- OT  Outcome: Ongoing (interventions implemented as appropriate)    04/19/17 1126   Transfer Training OT LTG   Transfer Training OT LTG, Date Established 04/19/17   Transfer Training OT LTG, Time to Achieve by discharge   Transfer Training OT LTG, Activity Type sit to stand/stand to sit;toilet   Transfer Training OT LTG, [place] Level supervision required;verbal cues required   Transfer Training OT LTG, Assist Device walker, rolling;commode,  bedside       Goal: Patient Education Goal LTG- OT  Outcome: Ongoing (interventions implemented as appropriate)    04/19/17 1126   Patient Education OT LTG   Patient Education OT LTG, Date Established 04/19/17   Patient Education OT LTG, Time to Achieve by discharge   Patient Education OT LTG, Education Type precautions per surgeon;posture/body mechanics;adaptive equipment mgmt;home safety   Patient Education OT LTG, Education Understanding demonstrates adequately;verbalizes understanding       Goal: LB Dressing Goal LTG- OT  Outcome: Ongoing (interventions implemented as appropriate)    04/19/17 1126   LB Dressing OT LTG   LB Dressing Goal OT LTG, Date Established 04/19/17   LB Dressing Goal OT LTG, Time to Achieve by discharge   LB Dressing Goal OT LTG, Activity Type demo LB dressing task    LB Dressing Goal OT LTG, [place] Level conditional independence   LB Dressing Goal OT LTG, Adaptive Equipment reacher;shoe horn, long handled

## 2017-04-19 NOTE — PROGRESS NOTES
"    [facility] Medicine Services  INPATIENT PROGRESS NOTE    Date of Admission: 4/17/2017  Length of Stay: 1  Primary Care Physician: Asael Lockhart MD    Subjective   CC:neck pain, weakness    HPI:  Sitting up in chair. Feels well, pain is well controlled. Neck is stiff, no muscle spasms. Has numbness/tingling in bilateral hands, but no numbness/tingling in BLE anymore. Eating okay, \"Im just a picky eater\". Eager to go home tomorrow if able. Using walker for ambulation.     Review Of Systems:   Review of Systems   Constitutional: Negative for fever.   Respiratory: Negative for shortness of breath.    Cardiovascular: Negative for chest pain and leg swelling.   Gastrointestinal: Negative for abdominal pain.   Musculoskeletal: Positive for neck pain.       Objective      Temp:  [97.6 °F (36.4 °C)-98.6 °F (37 °C)] 97.6 °F (36.4 °C)  Heart Rate:  [59-69] 69  Resp:  [16] 16  BP: (126-187)/(66-94) 158/83  Physical Exam   Constitutional: She is oriented to person, place, and time. She appears well-developed and well-nourished.   Appears uncomfortable   HENT:   Head: Normocephalic and atraumatic.   Eyes: Pupils are equal, round, and reactive to light.   Cardiovascular: Normal rate, regular rhythm and normal heart sounds.    No murmur heard.  Pulmonary/Chest: Effort normal and breath sounds normal.   Abdominal: Soft. Bowel sounds are normal. She exhibits no distension.   Musculoskeletal: She exhibits no edema.   Neck bandage clean, drain in place   Neurological: She is alert and oriented to person, place, and time.   Skin: Skin is warm and dry. No rash noted.   Psychiatric: She has a normal mood and affect.   Vitals reviewed.    Results Review:    I have reviewed the labs, radiology results and diagnostic studies.      Results from last 7 days  Lab Units 04/18/17  0449   WBC 10*3/mm3 6.72   HEMOGLOBIN g/dL 12.1   PLATELETS 10*3/mm3 429       Results from last 7 days  Lab Units 04/18/17  0449 "   SODIUM mmol/L 138   POTASSIUM mmol/L 3.5   CHLORIDE mmol/L 101   TOTAL CO2 mmol/L 29.0   BUN mg/dL 8*   CREATININE mg/dL 0.80   GLUCOSE mg/dL 86   CALCIUM mg/dL 10.2     Radiology Data:   MRI - Abnormal signal intensity within the spinal cord with severe  central spinal canal stenosis at the C3-C4 level. Large left paracentral  disc protrusion creating severe central spinal canal stenosis and  narrowing of the left neuroforamina. Compromise is again seen with edema in the spinal cord. There is narrowing as well seen at the C5-C6 and C6-C7 levels with mass effect on the spinal cord. Minimal cord change is seen at the C5-C6 level posteriorly    I have reviewed the medications.    Assessment/Plan     Problem List  Hospital Problem List     * (Principal)Cervical spondylosis with myelopathy    Hypertension    GERD (gastroesophageal reflux disease)    Arthritis    Depression    Spinal stenosis of cervical region    Tobacco abuse        Assessment/Plan:    - POD #1 c3/4 laminectomy and posterior fusion  - pain control per Dr. Garner  - watch BP, on home meds  - nicotine patch ordered, counseled and she would like patches to go home with  - NS cleared patient to be discharged or transferred when decided by PT/OT if patient needs inpatient vs outpt PT therapy.   -PT note indicates that pt would be appropriate for inpatient rehab consult due to her spastic gait and impaired dynamic balance putting her at high risk for falls.   -Will have CM speak with patient and discuss options for rehab. Pt endorsed she would be interested if needed, but would like to go home.     Urmila Cruz, APRN   04/19/17   1:51 PM    Please note that portions of this note may have been completed with a voice recognition program. Efforts were made to edit the dictations, but occasionally words are mistranscribed.

## 2017-04-19 NOTE — PROGRESS NOTES
Acute Care - Physical Therapy Initial Evaluation  [facility]     Patient Name: Maribel López  : 1960  MRN: [id number]  Today's Date: 2017   Onset of Illness/Injury or Date of Surgery Date: 17  Date of Referral to PT: 17  Referring Physician: Espinoza Garner      Admit Date: 2017     Visit Dx:    ICD-10-CM ICD-9-CM   1. Cervical spondylosis with myelopathy M47.12 721.1   2. Spinal stenosis of cervical region M48.02 723.0   3. Gait disturbance R26.9 781.2   4. Paresthesia of upper and lower extremities of both sides R20.2 782.0   5. Impaired mobility and ADLs Z74.09 799.89   6. Impaired functional mobility, balance, gait, and endurance Z74.09 V49.89     Patient Active Problem List   Diagnosis   • Hypertension   • GERD (gastroesophageal reflux disease)   • Arthritis   • Cervical spondylosis with myelopathy   • Depression   • Spinal stenosis of cervical region   • Tobacco abuse     Past Medical History:   Diagnosis Date   • Arthritis    • Depression    • GERD (gastroesophageal reflux disease)    • Hypertension      Past Surgical History:   Procedure Laterality Date   • CERVICAL LAMINECTOMY DECOMPRESSION POSTERIOR N/A 2017    Procedure: C3-4 CERVICAL LAMINECTOMY DECOMPRESSION POSTERIOR fusion ;  Surgeon: Ever Garner MD;  Location: [facility];  Service:    • CHOLECYSTECTOMY     • DILATATION AND CURETTAGE     • TUBAL ABDOMINAL LIGATION  2004    [facility]          PT ASSESSMENT (last 72 hours)      PT Evaluation       17 0820 17 0819    Rehab Evaluation    Document Type evaluation  -SC evaluation  -EL    Subjective Information agree to therapy  -SC agree to therapy;complains of;pain;weakness  -EL    Patient Effort, Rehab Treatment excellent  -SC excellent  -EL    Symptoms Noted During/After Treatment  fatigue  -EL    General Information    Patient Profile Review yes  -SC yes  -EL    Onset of Illness/Injury or Date of Surgery Date 17  -SC 17  -EL     Referring Physician DFr Pascual  -SC MD Pascual  -EL    General Observations in bed, drain intact  -SC Pt supine in bed on arrival, IV L UE, JESSICA drain  -EL    Pertinent History Of Current Problem 1 year hx of progressive weakness in arms and legs, S/P C34 post. fusion /lami for central stenosis with disc protrusion  -SC To ED with progressively worsening weakness all four extremities, gait instability, bilat hand numbness, neck pain, spasms, instances of incontinence, and multiple falls at home; symptoms x1 year, worsening x1 month. Pt is POD #1 C3-4 lami, decompression, posterior fusion for high-grade stenosis C3-4.   -EL    Precautions/Limitations fall precautions   recent falls  -SC fall precautions;spinal precautions   cervical precautions  -EL    Prior Level of Function all household mobility   walks holding onto furniture, falls backwards  -SC min assist:;community mobility;all household mobility;transfer;ADL's;home management;driving;shopping   pain increased with activity; gait instability   -EL    Equipment Currently Used at Home cane, straight;shower chair;walker, standard  -SC cane, straight;shower chair;walker, standard   was not using any DME PTA   -EL    Plans/Goals Discussed With patient;agreed upon  -SC patient;agreed upon  -    Risks Reviewed patient:;increased discomfort  -SC patient:;increased discomfort  -EL    Benefits Reviewed patient:;increase independence;improve function  -SC patient:;improve function;increase independence  -EL    Barriers to Rehab none identified  -SC previous functional deficit  -EL    Living Environment    Lives With significant other  -SC significant other  -EL    Living Arrangements condominium  -SC condominium  -EL    Home Accessibility no concerns  -SC tub/shower is not walk in;stairs to enter home;bed and bath on same level  -EL    Number of Stairs to Enter Home  1  -EL    Living Environment Comment  pt reports she has S.O. and neighbors who are available for  caregiving assistance   -    Clinical Impression    Date of Referral to PT 04/18/17  -SC     PT Diagnosis impaired mobility  -SC     Patient/Family Goals Statement go home  -SC     Criteria for Skilled Therapeutic Interventions Met yes;treatment indicated  -SC     Pathology/Pathophysiology Noted (Describe Specifically for Each System) musculoskeletal;neuromuscular  -SC     Impairments Found (describe specific impairments) gait, locomotion, and balance;motor function;muscle performance  -SC     Rehab Potential good, to achieve stated therapy goals  -SC     Vital Signs    Pre Systolic BP Rehab  --   vitals stable throughout; nsg consent for treatment   -    Pain Assessment    Pain Assessment 0-10  -SC     Pain Score 5  -SC     Post Pain Score 5  -SC     Pain Type Acute pain  -SC     Pain Location Neck  -SC     Pain Intervention(s) Repositioned  -SC     Cognitive Assessment/Intervention    Current Cognitive/Communication Assessment functional  -SC     Orientation Status oriented x 4  -SC     Follows Commands/Answers Questions 100% of the time  -SC     Personal Safety WNL/WFL  -SC     Personal Safety Interventions gait belt;fall prevention program maintained  -SC     ROM (Range of Motion)    General ROM other (see comments)   neck limited 50%  -SC     MMT (Manual Muscle Testing)    General MMT Assessment upper extremity strength deficits identified;lower extremity strength deficits identified  -SC     Upper Extremity    Upper Ext Manual Muscle Testing Detail L hands 3+/5  -SC     Lower Extremity    Lower Ext Manual Muscle Testing Detail L tib ant 3+/5  -SC     Muscle Tone Assessment    Muscle Tone Assessment Bilateral Lower Extremities   +CLONUS  -SC     Bed Mobility, Assessment/Treatment    Bed Mobility, Assistive Device bed rails;head of bed elevated  -SC     Bed Mob, Supine to Sit, [place] conditional independence  -SC     Transfer Assessment/Treatment    Transfers, Sit-Stand [place] contact guard  assist  -SC     Transfers, Stand-Sit [place] contact guard assist  -SC     Transfers, Sit-Stand-Sit, Assist Device rolling walker  -SC     Transfer, Safety Issues loses balance backward  -SC     Transfer, Impairments impaired balance  -SC     Transfer, Comment cues for safety and hand placement  -SC     Gait Assessment/Treatment    Gait, [place] Level contact guard assist  -SC     Gait, Assistive Device rolling walker  -SC     Gait, Distance (Feet) 300  -SC     Gait, Gait Pattern Analysis swing-through gait  -SC     Gait, Gait Deviations --   spastic gait  -SC     Gait, Safety Issues balance decreased during turns;weight-shifting ability decreased;loses balance backward  -SC     Gait, Impairments impaired balance;pain;motor control impaired  -SC     Gait, Comment Cues to stay close to walker. Demonstrates difficllty turning walker and staying inside. Spastic gait affecting balance with occational loss of balance requiring PT to give contact assist.   -SC     Therapy Exercises    Bilateral Upper Extremity 10 reps;AROM:;sitting;shoulder abduction/adduction   rowing, neck gentle flexion/rotation  -SC     Positioning and Restraints    Pre-Treatment Position in bed  -SC     Post Treatment Position chair  -SC     In Chair sitting;reclined;notified nsg;call light within reach;encouraged to call for assist;exit alarm on   neck roll issued for comfort  -SC       04/18/17 1458 04/17/17 1804    General Information    Equipment Currently Used at Home none  -MH none  -EK    Living Environment    Lives With significant other  - significant other  -EK    Living Arrangements condominium  - condominium  -EK    Home Accessibility  no concerns  -EK    Stair Railings at Home  none  -EK    Type of Financial/Environmental Concern  none  -EK    Transportation Available car;family or friend will provide  - none  -EK      User Key  (r) = Recorded By, (t) = Taken By, (c) = Cosigned By    Initials Name Provider Type    SC  Rosa Maria Pan, PT Physical Therapist     Bridgette Gallardo, RN Case Manager    JEANNETTE Machado, RN Registered Nurse    LEA Bose, OT Occupational Therapist          Physical Therapy Education     Title: PT OT SLP Therapies (Done)     Topic: Physical Therapy (Done)     Point: Mobility training (Done)    Learning Progress Summary    Learner Readiness Method Response Comment Documented by Status   Patient Eager E,D,H,TB VU,DU,NR reviewed precautions, hep, safety with gait SC 04/19/17 0952 Done               Point: Home exercise program (Done)    Learning Progress Summary    Learner Readiness Method Response Comment Documented by Status   Patient Eager E,D,H,TB VU,DU,NR reviewed precautions, hep, safety with gait SC 04/19/17 0952 Done               Point: Body mechanics (Done)    Learning Progress Summary    Learner Readiness Method Response Comment Documented by Status   Patient Eager E,D,H,TB VU,DU,NR reviewed precautions, hep, safety with gait SC 04/19/17 0952 Done               Point: Precautions (Done)    Learning Progress Summary    Learner Readiness Method Response Comment Documented by Status   Patient Eager E,D,H,TB VU,DU,NR reviewed precautions, hep, safety with gait SC 04/19/17 0952 Done                      User Key     Initials Effective Dates Name Provider Type Discipline    SC 06/19/15 -  Rosa Maria Pan, PT Physical Therapist PT                PT Recommendation and Plan  Anticipated Equipment Needs At Discharge: gait belt (rollator walker)  Anticipated Discharge Disposition: inpatient rehabilitation facility  Planned Therapy Interventions: gait training, bed mobility training, balance training, motor coordination training, patient/family education, strengthening, transfer training  PT Frequency: daily  Plan of Care Review  Plan Of Care Reviewed With: patient  Progress: improving  Outcome Summary/Follow up Plan: Patient pod#1 s/p decompression/fusion C34. She presents with a spastic gait and  impaired dynamic balance putting her at risk for falls. I would like to try a rollator walker with her to help with upright stability.  I have recommended an inpatient rehab consult.           IP PT Goals       04/19/17 0953          Bed Mobility PT LTG    Bed Mobility PT LTG, Date Established 04/19/17  -SC      Bed Mobility PT LTG, Activity Type all bed mobility  -SC      Bed Mobility PT LTG, [place] Level independent  -SC      Bed Mobility PT LTG, Outcome goal ongoing  -SC      Transfer Training PT LTG    Transfer Training PT LTG, Date Established 04/19/17  -SC      Transfer Training PT LTG, Time to Achieve 5 - 7 days  -SC      Transfer Training PT LTG, Activity Type sit to stand/stand to sit  -SC      Transfer Training PT LTG, [place] Level supervision required  -SC      Transfer Training PT LTG, Assist Device walker, rolling  -SC      Transfer Training PT LTG, Outcome goal ongoing  -SC      Gait Training PT LTG    Gait Training Goal PT LTG, Date Established 04/19/17  -SC      Gait Training Goal PT LTG, Time to Achieve 5 - 7 days  -SC      Gait Training Goal PT LTG, [place] Level supervision required  -SC      Gait Training Goal PT LTG, Assist Device walker, rolling  -SC      Gait Training Goal PT LTG, Distance to Achieve 400  -SC      Gait Training Goal PT LTG, Outcome goal ongoing  -SC        User Key  (r) = Recorded By, (t) = Taken By, (c) = Cosigned By    Initials Name Provider Type    MARIIA Pan, PT Physical Therapist                Outcome Measures       04/19/17 0820          How much help from another person do you currently need...    Turning from your back to your side while in flat bed without using bedrails? 4  -SC      Moving from lying on back to sitting on the side of a flat bed without bedrails? 3  -SC      Moving to and from a bed to a chair (including a wheelchair)? 3  -SC      Standing up from a chair using your arms (e.g., wheelchair, bedside chair)? 3  -SC      Climbing  3-5 steps with a railing? 3  -SC      To walk in hospital room? 3  -SC      AM-PAC 6 Clicks Score 19  -SC      Functional Assessment    Outcome Measure Options AM-PAC 6 Clicks Basic Mobility (PT)  -SC        User Key  (r) = Recorded By, (t) = Taken By, (c) = Cosigned By    Initials Name Provider Type    SC Rosa Maria Pan, PT Physical Therapist           Time Calculation:         PT Charges       04/19/17 1000          Time Calculation    Start Time 0820  -SC      PT Received On 04/19/17  -SC      PT Goal Re-Cert Due Date 04/29/17  -SC        User Key  (r) = Recorded By, (t) = Taken By, (c) = Cosigned By    Initials Name Provider Type    SC Rosa Maria Pan, PT Physical Therapist          Therapy Charges for Today     Code Description Service Date Service Provider Modifiers Qty    56801770274 HC PT EVAL MOD COMPLEXITY 4 4/19/2017 Rosa Maria Pan, PT GP 1          PT G-Codes  Outcome Measure Options: AM-PAC 6 Clicks Basic Mobility (PT)      Rosa Maria Pan, PT  4/19/2017

## 2017-04-20 VITALS
RESPIRATION RATE: 16 BRPM | TEMPERATURE: 98.4 F | OXYGEN SATURATION: 98 % | HEIGHT: 60 IN | WEIGHT: 125 LBS | BODY MASS INDEX: 24.54 KG/M2 | HEART RATE: 69 BPM | SYSTOLIC BLOOD PRESSURE: 140 MMHG | DIASTOLIC BLOOD PRESSURE: 83 MMHG

## 2017-04-20 PROCEDURE — 99239 HOSP IP/OBS DSCHRG MGMT >30: CPT | Performed by: NURSE PRACTITIONER

## 2017-04-20 PROCEDURE — G8980 MOBILITY D/C STATUS: HCPCS

## 2017-04-20 PROCEDURE — 97110 THERAPEUTIC EXERCISES: CPT

## 2017-04-20 PROCEDURE — 94799 UNLISTED PULMONARY SVC/PX: CPT

## 2017-04-20 PROCEDURE — 97116 GAIT TRAINING THERAPY: CPT

## 2017-04-20 PROCEDURE — G8978 MOBILITY CURRENT STATUS: HCPCS

## 2017-04-20 PROCEDURE — 25010000002 ENOXAPARIN PER 10 MG: Performed by: NEUROLOGICAL SURGERY

## 2017-04-20 PROCEDURE — 97530 THERAPEUTIC ACTIVITIES: CPT

## 2017-04-20 RX ORDER — NICOTINE 21 MG/24HR
1 PATCH, TRANSDERMAL 24 HOURS TRANSDERMAL DAILY
Qty: 28 PATCH | Refills: 0 | Status: SHIPPED | OUTPATIENT
Start: 2017-04-20

## 2017-04-20 RX ORDER — HYDROCODONE BITARTRATE AND ACETAMINOPHEN 5; 325 MG/1; MG/1
1 TABLET ORAL EVERY 4 HOURS PRN
Qty: 18 TABLET | Refills: 0
Start: 2017-04-20 | End: 2017-04-28

## 2017-04-20 RX ADMIN — HYDROCODONE BITARTRATE AND ACETAMINOPHEN 1 TABLET: 5; 325 TABLET ORAL at 05:30

## 2017-04-20 RX ADMIN — ENOXAPARIN SODIUM 40 MG: 40 INJECTION SUBCUTANEOUS at 08:44

## 2017-04-20 RX ADMIN — DULOXETINE 120 MG: 60 CAPSULE, DELAYED RELEASE ORAL at 08:44

## 2017-04-20 RX ADMIN — DOCUSATE SODIUM 100 MG: 100 CAPSULE, LIQUID FILLED ORAL at 08:44

## 2017-04-20 RX ADMIN — OXYCODONE AND ACETAMINOPHEN 1 TABLET: 5; 325 TABLET ORAL at 10:08

## 2017-04-20 RX ADMIN — NICOTINE 1 PATCH: 21 PATCH, EXTENDED RELEASE TRANSDERMAL at 09:00

## 2017-04-20 RX ADMIN — TRIAMTERENE AND HYDROCHLOROTHIAZIDE 1 TABLET: 37.5; 25 TABLET ORAL at 08:44

## 2017-04-20 RX ADMIN — PANTOPRAZOLE SODIUM 40 MG: 40 TABLET, DELAYED RELEASE ORAL at 05:30

## 2017-04-20 RX ADMIN — HYDROCODONE BITARTRATE AND ACETAMINOPHEN 1 TABLET: 5; 325 TABLET ORAL at 00:35

## 2017-04-20 NOTE — DISCHARGE INSTRUCTIONS
You are off work until your follow-up appointment with neurosurgery. They will provide a work excuse.     [facility] has provided you with a rolling walker.     [facility] has provided you with a rolling walker.     You have a dermabond prineo dressing in place. This dressing is waterproof and will remain in place for 2 weeks. It is waterproof and you may shower with it on. You do not need to cover the incision with this dressing in place.

## 2017-04-20 NOTE — PROGRESS NOTES
Acute Care - Occupational Therapy Treatment Note  [facility]     Patient Name: Maribel López  : 1960  MRN: [id number]  Today's Date: 2017  Onset of Illness/Injury or Date of Surgery Date: 17  Date of Referral to OT: 17  Referring Physician: Espinoza Garner      Admit Date: 2017    Visit Dx:     ICD-10-CM ICD-9-CM   1. Cervical spondylosis with myelopathy M47.12 721.1   2. Spinal stenosis of cervical region M48.02 723.0   3. Gait disturbance R26.9 781.2   4. Paresthesia of upper and lower extremities of both sides R20.2 782.0   5. Impaired mobility and ADLs Z74.09 799.89   6. Impaired functional mobility, balance, gait, and endurance Z74.09 V49.89     Patient Active Problem List   Diagnosis   • Hypertension   • GERD (gastroesophageal reflux disease)   • Arthritis   • Cervical spondylosis with myelopathy   • Depression   • Spinal stenosis of cervical region   • Tobacco abuse             Adult Rehabilitation Note       17 1100 17 1030 17 0936    Rehab Assessment/Intervention    Discipline  (P)  physical therapist  -MB occupational therapist  -EL    Document Type  (P)  therapy note (daily note);discharge summary  -MB therapy note (daily note)  -EL    Subjective Information  (P)  agree to therapy;complains of;pain;weakness  -MB agree to therapy;complains of;pain  -EL    Patient Effort, Rehab Treatment  (P)  excellent  -MB excellent  -EL    Symptoms Noted During/After Treatment  (P)  fatigue  -MB increased pain  -EL    Precautions/Limitations  (P)  fall precautions;spinal precautions   cervical precautions  -MB fall precautions;spinal precautions   cervical precautions  -EL    Recorded by  [MB] Kaylee Ramires, PT [EL] Stefania Bose OT    Vital Signs    Pre Systolic BP Rehab  (P)  --   VSS throughout; Nsg consent for tx.  -MB --   vitals stable throughout; nsg consent for treatment   -EL    Pre Patient Position  (P)  Sitting  -MB Supine  -EL    Intra Patient Position   (P)  Standing  -MB Standing  -EL    Post Patient Position  (P)  Sitting  -MB Sitting  -EL    Recorded by  [MB] Kaylee Ramires, PT [EL] Stefania Bose OT    Pain Assessment    Pain Assessment  (P)  0-10  -MB 0-10  -EL    Pain Score  (P)  2  -MB 0  -EL    Post Pain Score  (P)  3  -MB 4  -EL    Pain Type  (P)  Acute pain  -MB Acute pain  -EL    Pain Location  (P)  Neck  -MB Neck  -EL    Pain Orientation  (P)  Posterior  -MB     Pain Intervention(s)  (P)  Repositioned;Medication (See MAR);Ambulation/increased activity   Nsg aware.  -MB Repositioned;Ambulation/increased activity;Medication (See MAR)  -EL    Response to Interventions   tolerated   -EL    Recorded by  [MB] Kaylee Ramires, PT [EL] Stefania Bose, OT    Cognitive Assessment/Intervention    Current Cognitive/Communication Assessment  (P)  functional  -MB functional  -EL    Orientation Status  (P)  oriented x 4  -MB oriented x 4  -EL    Follows Commands/Answers Questions  (P)  100% of the time;able to follow single-step instructions  -MB able to follow single-step instructions;100% of the time  -EL    Personal Safety  (P)  good awareness, safety precautions  -MB WNL/WFL  -EL    Personal Safety Interventions  (P)  fall prevention program maintained;gait belt;muscle strengthening facilitated;nonskid shoes/slippers when out of bed  -MB fall prevention program maintained;nonskid shoes/slippers when out of bed;muscle strengthening facilitated;gait belt  -EL    Recorded by  [MB] Kaylee Ramires, PT [EL] Stefania Bose, OT    Bed Mobility, Assessment/Treatment    Bed Mobility, Assistive Device   bed rails;head of bed elevated  -EL    Bed Mob, Supine to Sit, [place] (P)  not tested  -MB  conditional independence  -EL    Bed Mobility, Impairments   ROM decreased;strength decreased   spinal precautions   -EL    Bed Mobility, Comment (P)  Pt. received sitting UIC.  -MB  VC for log rolling  -EL    Recorded by [MB] Kaylee Ramires, PT  [EL] Stefania CESPEDES  Lidya, OT    Transfer Assessment/Treatment    Transfers, Sit-Stand [place] (P)  contact guard assist;verbal cues required  -MB  contact guard assist;verbal cues required  -EL    Transfers, Stand-Sit [place] (P)  contact guard assist;verbal cues required  -MB  contact guard assist;verbal cues required  -EL    Transfers, Sit-Stand-Sit, Assist Device (P)  other (see comments)   rollator  -MB  --   rollator   -EL    Toilet Transfer, [place] (P)  contact guard assist;verbal cues required  -MB  contact guard assist;verbal cues required  -EL    Toilet Transfer, Assistive Device (P)  elevated toilet seat   rollator  -MB  --   rollator   -EL    Transfer, Safety Issues (P)  step length decreased;weight-shifting ability decreased  -MB      Transfer, Impairments (P)  ROM decreased;strength decreased;impaired balance  -MB  strength decreased;impaired balance  -EL    Transfer, Comment (P)  VCs for safety, to lock rollator brakes prior to t/f.  -MB  VC for safe body mechanics, lock brakes on rollator prior to transfers, reach back for proper hand placement; pt completed sit to stand from EOB, toilet, and practiced transfers to sitting on rollator seat with proper sequencing/ safety awareness   -EL    Recorded by [MB] Kaylee Ramires, PT  [EL] Stefania Bose, OT    Gait Assessment/Treatment    Gait, [place] Level (P)  contact guard assist;verbal cues required  -MB      Gait, Assistive Device (P)  other (see comments)   rollator  -MB      Gait, Distance (Feet) (P)  300  -MB      Gait, Gait Pattern Analysis (P)  swing-through gait  -MB      Gait, Gait Deviations (P)  adia decreased;bilateral:;step length decreased;ataxia;left:;decreased heel strike;toe-to-floor clearance decreased  -MB      Gait, Safety Issues (P)  balance decreased during turns;weight-shifting ability decreased  -MB      Gait, Impairments (P)  ROM decreased;strength decreased;impaired balance;motor control impaired;pain  -MB      Gait,  Comment (P)  Pt. amb slowly w/ dec foot clearance and heel strike L LE.  Pt. demo slight ataxia in trunk, especially noted with unilateral LE weight-bearing while advancing opposite LE.   -MB      Recorded by [MB] Kaylee Ramires, PT      Functional Mobility    Functional Mobility- Ind. Level   contact guard assist;verbal cues required  -EL    Functional Mobility- Device   rollator  -EL    Functional Mobility-Distance (Feet)   20  -EL    Functional Mobility- Safety Issues   step length decreased  -EL    Functional Mobility- Comment   good safety awareness; pt with minimal unsteadiness upon standing initially with rollator; improved standing balance and mobility throughout   -EL    Recorded by   [EL] Stefania Bose, OT    Lower Body Dressing Assessment/Training    LB Dressing Assess/Train, Clothing Type   doffing:;donning:;slipper socks  -EL    LB Dressing Assess/Train, Position   sitting  -EL    LB Dressing Assess/Train, [place]   conditional independence  -EL    LB Dressing Assess/Train, Comment   performed LB dressing using cross leg technique in sitting, maintained spinal precautions throughout; simulated use of LH shoe horn and reacher    -EL    Recorded by   [EL] Stefania Bose OT    Motor Skills/Interventions    Additional Documentation (P)  Balance Skills Training (Group)  -MB      Recorded by [MB] Kaylee Ramires, PT      Balance Skills Training    Sitting-Level of Assistance   Distant supervision  -EL    Sitting-Balance Support   Feet supported  -EL    Sitting-Balance Activities   Reaching for objects;Trunk control activities  -EL    Standing-Level of Assistance   Contact guard  -EL    Static Standing Balance Support   assistive device  -EL    Standing-Balance Activities   Weight Shift A-P;Weight Shift R-L  -EL    Gait Balance-Level of Assistance   Contact guard  -EL    Gait Balance Support   assistive device  -EL    Gait Balance Activities   scanning environment R/L;side-stepping  -EL     Recorded by   [EL] Stefania Bose OT    Positioning and Restraints    Pre-Treatment Position   in bed  -EL    Post Treatment Position   chair  -EL    In Chair   notified nsg;reclined;sitting;call light within reach;encouraged to call for assist;exit alarm on;legs elevated  -EL    Recorded by   [EL] Stefania Bose, OT      User Key  (r) = Recorded By, (t) = Taken By, (c) = Cosigned By    Initials Name Effective Dates    MB Kaylee Ramires, PT 03/14/16 -     EL Stefania Bose, OT 06/08/16 -                 OT Goals       04/20/17 1158 04/19/17 1126       Bed Mobility OT LTG    Bed Mobility OT LTG, Date Established  04/19/17  -EL     Bed Mobility OT LTG, Time to Achieve  by discharge  -EL     Bed Mobility OT LTG, Activity Type  supine to sit/sit to supine  -EL     Bed Mobility OT LTG, [place] Level  conditional independence  -EL     Bed Mobility OT LTG, Additional Goal met goal for supine to sit 4/20  -EL      Bed Mobility OT LTG, Outcome goal partially met  -EL      Transfer Training OT LTG    Transfer Training OT LTG, Date Established  04/19/17  -EL     Transfer Training OT LTG, Time to Achieve  by discharge  -EL     Transfer Training OT LTG, Activity Type  sit to stand/stand to sit;toilet  -EL     Transfer Training OT LTG, [place] Level  supervision required;verbal cues required  -EL     Transfer Training OT LTG, Assist Device  walker, rolling;commode, bedside  -EL     Transfer Training OT LTG, Outcome goal ongoing  -EL      Patient Education OT LTG    Patient Education OT LTG, Date Established  04/19/17  -EL     Patient Education OT LTG, Time to Achieve  by discharge  -EL     Patient Education OT LTG, Education Type  precautions per surgeon;posture/body mechanics;adaptive equipment mgmt;home safety  -EL     Patient Education OT LTG, Education Understanding  demonstrates adequately;verbalizes understanding  -EL     Patient Education OT LTG Outcome goal ongoing  -EL      LB Dressing OT LTG    LB  Dressing Goal OT LTG, Date Established  04/19/17  -EL     LB Dressing Goal OT LTG, Time to Achieve  by discharge  -EL     LB Dressing Goal OT LTG, Activity Type  demo LB dressing task   -EL     LB Dressing Goal OT LTG, [place] Level  conditional independence  -EL     LB Dressing Goal OT LTG, Adaptive Equipment  reacher;shoe horn, long handled  -EL     LB Dressing Goal OT LTG, Outcome goal met  -EL        User Key  (r) = Recorded By, (t) = Taken By, (c) = Cosigned By    Initials Name Provider Type    LEA Bose OT Occupational Therapist          Occupational Therapy Education     Title: PT OT SLP Therapies (Done)     Topic: Occupational Therapy (Done)     Point: ADL training (Done)    Description: Instruct learner(s) on proper safety adaptation and remediation techniques during self care or transfers.   Instruct in proper use of assistive devices.    Learning Progress Summary    Learner Readiness Method Response Comment Documented by Status   Patient Acceptance E ZEYNEP MICHELLE Educated on cervical precautions, AE management, and proper body mechanics for bed mobility (log rolling) and transfers with rollator. EL 04/20/17 1157 Done    Acceptance E CORDELL MICHELLE Educated on safety for transfers, cervical precautions, and adaptive techniques for LB ADL with AE. EL 04/19/17 1125 Done               Point: Precautions (Done)    Description: Instruct learner(s) on prescribed precautions during self-care and functional transfers.    Learning Progress Summary    Learner Readiness Method Response Comment Documented by Status   Patient Acceptance E ZEYNEP MICHELLE Educated on cervical precautions, AE management, and proper body mechanics for bed mobility (log rolling) and transfers with rollator. EL 04/20/17 1157 Done    Acceptance E CORDELL MICHELLE Educated on safety for transfers, cervical precautions, and adaptive techniques for LB ADL with AE. EL 04/19/17 1125 Done               Point: Body mechanics (Done)    Description: Instruct learner(s) on  proper positioning and spine alignment during self-care, functional mobility activities and/or exercises.    Learning Progress Summary    Learner Readiness Method Response Comment Documented by Status   Patient Acceptance E ZEYNEP MICHELLE Educated on cervical precautions, AE management, and proper body mechanics for bed mobility (log rolling) and transfers with rollator.  04/20/17 1157 Done    Acceptance E CORDELL MICHELLE Educated on safety for transfers, cervical precautions, and adaptive techniques for LB ADL with AE.  04/19/17 1125 Done                      User Key     Initials Effective Dates Name Provider Type Discipline     06/08/16 -  Stefania Bose, OT Occupational Therapist OT                  OT Recommendation and Plan  Anticipated Equipment Needs At Discharge: bathing equipment, reacher, dressing equipment, rollator (issued AE for LB ADL this date )  Anticipated Discharge Disposition: inpatient rehabilitation facility, home with home health (would benefit from inpt rehab; requesting home with  )  Planned Therapy Interventions: activity intolerance, adaptive equipment training, ADL retraining, balance training, bed mobility training, home exercise program, transfer training  Therapy Frequency: daily  Plan of Care Review  Plan Of Care Reviewed With: patient  Progress: progress toward functional goals as expected  Outcome Summary/Follow up Plan: Pt demo improved standing balance and mobility this date. Improved independence with transfers. Will continue to benefit from OT services to promote safety awareness, and for continuation of transfer and bed mobility training.         Outcome Measures       04/20/17 0936 04/19/17 0820 04/19/17 0819    How much help from another person do you currently need...    Turning from your back to your side while in flat bed without using bedrails?  4  -SC     Moving from lying on back to sitting on the side of a flat bed without bedrails?  3  -SC     Moving to and from a bed to a chair  (including a wheelchair)?  3  -SC     Standing up from a chair using your arms (e.g., wheelchair, bedside chair)?  3  -SC     Climbing 3-5 steps with a railing?  3  -SC     To walk in hospital room?  3  -SC     AM-PAC 6 Clicks Score  19  -SC     How much help from another is currently needed...    Putting on and taking off regular lower body clothing? 3  -EL  3  -EL    Bathing (including washing, rinsing, and drying) 3  -EL  3  -EL    Toileting (which includes using toilet bed pan or urinal) 3  -EL  3  -EL    Putting on and taking off regular upper body clothing 3  -EL  3  -EL    Taking care of personal grooming (such as brushing teeth) 3  -EL  3  -EL    Eating meals 4  -EL  4  -EL    Score 19  -EL  19  -EL    Functional Assessment    Outcome Measure Options AM-PAC 6 Clicks Daily Activity (OT)  -EL AM-PAC 6 Clicks Basic Mobility (PT)  -SC AM-PAC 6 Clicks Daily Activity (OT)  -EL      User Key  (r) = Recorded By, (t) = Taken By, (c) = Cosigned By    Initials Name Provider Type    SC Rosa Maria Pan, PT Physical Therapist    EL Stefania Bose, OT Occupational Therapist           Time Calculation:         Time Calculation- OT       04/20/17 1202          Time Calculation- OT    OT Start Time 0936  -EL      Total Timed Code Minutes- OT 25 minute(s)  -EL      OT Received On 04/20/17  -EL      OT Goal Re-Cert Due Date 04/29/17  -EL        User Key  (r) = Recorded By, (t) = Taken By, (c) = Cosigned By    Initials Name Provider Type    EL Stefania Bose OT Occupational Therapist           Therapy Charges for Today     Code Description Service Date Service Provider Modifiers Qty    35001463517 HC OT EVAL MOD COMPLEXITY 4 4/19/2017 Stefania Bose OT GO 1    19171985570 HC OT THERAPEUTIC ACT EA 15 MIN 4/19/2017 Stefania Bose OT GO 1    68568625060 HC OT THERAPEUTIC ACT EA 15 MIN 4/20/2017 Stefania Bose OT GO 2               Stefania Bose OT  4/20/2017

## 2017-04-20 NOTE — PLAN OF CARE
Problem: Patient Care Overview (Adult)  Goal: Plan of Care Review  Outcome: Outcome(s) achieved Date Met:  04/20/17 04/20/17 1601   Coping/Psychosocial Response Interventions   Plan Of Care Reviewed With patient   Patient Care Overview   Progress improving   Outcome Evaluation   Outcome Summary/Follow up Plan Pt tolerates ambulation with walker and standby assist. Vitals WNL. Pain is not improved with prn pain meds. Pt D/C'd home.          Problem: Laminectomy/Foraminotomy/Discectomy (Adult)  Goal: Signs and Symptoms of Listed Potential Problems Will be Absent or Manageable (Laminectomy/Foraminotomy/Discectomy)  Outcome: Outcome(s) achieved Date Met:  04/20/17 04/20/17 1601   Laminectomy/Foraminotomy/Discectomy   Problems Assessed (Laminectomy/Laminotomy/Discectomy) all   Problems Present (Laminectomy/Laminotomy/Discectomy) pain;constipation;functional decline/self-care deficit         Problem: Pain, Acute (Adult)  Goal: Identify Related Risk Factors and Signs and Symptoms  Outcome: Outcome(s) achieved Date Met:  04/20/17 04/20/17 1601   Pain, Acute   Related Risk Factors (Acute Pain) patient perception;surgery;knowledge deficit   Signs and Symptoms (Acute Pain) BADLs/IADLs reluctance/inability to perform;constipation/diarrhea;fatigue/weakness;verbalization of pain descriptors       Goal: Acceptable Pain Control/Comfort Level  Outcome: Outcome(s) achieved Date Met:  04/20/17 04/20/17 1601   Pain, Acute (Adult)   Acceptable Pain Control/Comfort Level achieves outcome         Problem: Perioperative Period (Adult)  Goal: Signs and Symptoms of Listed Potential Problems Will be Absent or Manageable (Perioperative Period)  Outcome: Outcome(s) achieved Date Met:  04/20/17 04/20/17 1601   Perioperative Period   Problems Assessed (Perioperative Period) all   Problems Present (Perioperative Period) pain         Problem: Fall Risk (Adult)  Goal: Identify Related Risk Factors and Signs and Symptoms  Outcome:  Outcome(s) achieved Date Met:  04/20/17 04/20/17 1601   Fall Risk   Fall Risk: Related Risk Factors gait/mobility problems;environment unfamiliar   Fall Risk: Signs and Symptoms presence of risk factors       Goal: Absence of Falls  Outcome: Outcome(s) achieved Date Met:  04/20/17 04/20/17 1601   Fall Risk (Adult)   Absence of Falls achieves outcome

## 2017-04-20 NOTE — DISCHARGE SUMMARY
[facility] Medicine Services  DISCHARGE SUMMARY       Date of Admission: 4/17/2017  Date of Discharge:  4/20/2017  Primary Care Physician: Asael Lockhart MD  Consulting Physician(s)     Provider Relationship    Ever Garner MD Consulting Physician          Discharge Diagnoses:  Active Hospital Problems (** Indicates Principal Problem)    Diagnosis Date Noted   • **Cervical spondylosis with myelopathy [M47.12] 04/17/2017   • Tobacco abuse [Z72.0] 04/18/2017   • Spinal stenosis of cervical region [M48.02] 04/17/2017   • Hypertension [I10]    • GERD (gastroesophageal reflux disease) [K21.9]    • Arthritis [M19.90]    • Depression [F32.9]       Resolved Hospital Problems    Diagnosis Date Noted Date Resolved   No resolved problems to display.       Presenting Problem/History of Present Illness  Cervical spondylosis with myelopathy [M47.12]  Spinal stenosis of cervical region [M48.02]     History of Present Illness on Day of Discharge:   Resting in chair. NAD. Pain is controlled currently. Eager to go home. Denies f/c, n/v/d, SOA, or CP.     Hospital Course  Patient is a 56 y.o. female presented to the ED with complaints of numbness of the hands for approximately one year that has been worsening over the past month. The patient reported that about a year ago she began having neck pain and spasms that have progressively worsened. She did see a chiropractor at the time with no significant improvement of her symptoms. She had some progressive numbness and weakness since then, now in all of her extremities. The patient had also reported some urinary incontinence as well as some constipation and fecal incontinence over the past month or so. She states that she has been having some difficulty with ambulation and an unsteady gait for approximately one month. The patient was concerned that she was having a stroke and did undergo imaging of her brain that was performed by her PCP,  but she hasn't had any imaging of her spine that she knows of. In the ED, she underwent an MRI of her C spine that showed severe central spinal canal stenosis at the C3/C4 level with large left paracentral disc protrusion. Narrowing of C5/C6 and C6/C7 with mass effect on the spinal cord. Dr. Garner was consulted by the ED and she will be taken to the OR for a laminectomy. Hospital medicine services was contacted for admission. Underwent a C3-4 cervical laminectomy decompression posterior fusion on 4-18-17. No immediate postoperative complications. PT evaluation recommended inpt rehab, but pt prefers to go home with home slick, ordered pt a rollator walker to help with upright stability. Will be discharged home with home health PT. Will need to follow up with NS in 2 weeks, and PCP in 1 week. Will remain off work until follow up with NS.     Procedures Performed  Procedure(s):  C3-4 CERVICAL LAMINECTOMY DECOMPRESSION POSTERIOR fusion        Consults:   Consults     No orders found from 3/19/2017 to 4/18/2017.          Pertinent Test Results:    Results from last 7 days  Lab Units 04/18/17  0449 04/17/17  1127   WBC 10*3/mm3 6.72 9.12   HEMOGLOBIN g/dL 12.1 12.8   HEMATOCRIT % 36.9 38.7   PLATELETS 10*3/mm3 429 448       Results from last 7 days  Lab Units 04/18/17  0449 04/17/17  1127   SODIUM mmol/L 138 138   POTASSIUM mmol/L 3.5 3.7   CHLORIDE mmol/L 101 101   TOTAL CO2 mmol/L 29.0 34.0*   BUN mg/dL 8* 7*   CREATININE mg/dL 0.80 0.80   GLUCOSE mg/dL 86 75   CALCIUM mg/dL 10.2 10.6*     MRI brain   IMPRESSION:  Minimal chronic small vessel ischemic changes seen within  the periventricular and subcortical white matter. Motion artifact  degrades image quality. No acute intracranial abnormality is identified.  No abnormal contrast enhancement.    IMPRESSION:  Abnormal signal intensity within the spinal cord with severe  central spinal canal stenosis at the C3-C4 level. Large left paracentral  disc protrusion creating  "severe central spinal canal stenosis and  narrowing of the left neuroforamina. Compromise is again seen with edema  in the spinal cord. There is narrowing as well seen at the C5-C6 and  C6-C7 levels with mass effect on the spinal cord. Minimal cord change is  seen at the C5-C6 level posteriorly. Clinical correlation is needed.        Condition on Discharge:  Stable     Physical Exam on Discharge:/83  Pulse 69  Temp 98.4 °F (36.9 °C)  Resp 16  Ht 60\" (152.4 cm)  Wt 125 lb (56.7 kg)  SpO2 98%  BMI 24.41 kg/m2  Physical Exam   Constitutional: She is oriented to person, place, and time. She appears well-developed and well-nourished. No distress.   HENT:   Head: Normocephalic and atraumatic.   Mouth/Throat: Oropharynx is clear and moist.   Eyes: Conjunctivae are normal. Pupils are equal, round, and reactive to light.   Neck: Neck supple. Decreased range of motion present. No thyromegaly present.   Cardiovascular: Normal rate, regular rhythm, normal heart sounds and intact distal pulses.  Exam reveals no gallop and no friction rub.    No murmur heard.  Pulmonary/Chest: Effort normal and breath sounds normal. No respiratory distress. She has no wheezes. She has no rales.   Abdominal: Soft. Bowel sounds are normal. She exhibits no distension. There is no tenderness.   Musculoskeletal: She exhibits no edema or tenderness.   Neurological: She is alert and oriented to person, place, and time.   Speech clear and understandable  Tingling only in finger tips    Skin: Skin is warm and dry. No rash noted. She is not diaphoretic. No erythema. No pallor.   Posterior cervical spine with coverderm CDI in place.    Psychiatric: She has a normal mood and affect. Her behavior is normal.   Vitals reviewed.    Discharge Disposition  Home-Health Care [facility]    Discharge Medications   Maribel López   Home Medication Instructions WALLACE:[id number]    Printed on:04/20/17 1523   Medication Information                    "   DULoxetine (CYMBALTA) 60 MG capsule  Take 120 mg by mouth Daily.             esomeprazole (nexIUM) 40 MG capsule  Take 40 mg by mouth Daily.             HYDROcodone-acetaminophen (NORCO) 5-325 MG per tablet  Take 1 tablet by mouth Every 4 (Four) Hours As Needed for Moderate Pain (4-6) for up to 8 days.             nicotine (NICODERM CQ) 21 MG/24HR patch  Place 1 patch on the skin Daily.             triamterene-hydrochlorothiazide (DYAZIDE) 37.5-25 MG per capsule  Take 1 capsule by mouth Every Morning.                 Discharge Diet:   Diet Instructions     You may resume a regular diet.                Discharge Care Plan / Instructions:    Activity at Discharge:   Activity Instructions     Other Instructions (Specify)       Follow all activity restrictions as per handout from Neurosurgery                 Follow-up Appointments  Future Appointments  Date Time Provider Department Center   5/5/2017 11:30 AM [facility] None     Additional Instructions for the Follow-ups that You Need to Schedule     Discharge Follow-Up With Specified Provider    As directed    To:  Dr Graner   Follow Up:  2 Weeks   Has the patient’s follow-up appointment been scheduled and documented in the discharge navigator?:  Patient to schedule, documented in the follow-up section       Discharge Follow-up with PCP    As directed    Follow Up Details:  1 week   Has the patient’s follow-up appointment been scheduled and documented in the discharge navigator?:  Yes, documented in the appointment section                 Test Results Pending at Discharge       PATRICIO Paz 04/20/17 3:23 PM    Time: 40 min     Please note that portions of this note may have been completed with a voice recognition program. Efforts were made to edit the dictations, but occasionally words are mistranscribed.

## 2017-04-20 NOTE — PROGRESS NOTES
Acute Care - Physical Therapy Treatment Note  [facility]     Patient Name: Maribel López  : 1960  MRN: [id number]  Today's Date: 2017  Onset of Illness/Injury or Date of Surgery Date: 17  Date of Referral to PT: 17  Referring Physician: Espinoza Garner    Admit Date: 2017    Visit Dx:    ICD-10-CM ICD-9-CM   1. Cervical spondylosis with myelopathy M47.12 721.1   2. Spinal stenosis of cervical region M48.02 723.0   3. Gait disturbance R26.9 781.2   4. Paresthesia of upper and lower extremities of both sides R20.2 782.0   5. Impaired mobility and ADLs Z74.09 799.89   6. Impaired functional mobility, balance, gait, and endurance Z74.09 V49.89     Patient Active Problem List   Diagnosis   • Hypertension   • GERD (gastroesophageal reflux disease)   • Arthritis   • Cervical spondylosis with myelopathy   • Depression   • Spinal stenosis of cervical region   • Tobacco abuse               Adult Rehabilitation Note       17 1100 17 1030 17 0936    Rehab Assessment/Intervention    Discipline  physical therapist  -MB occupational therapist  -EL    Document Type  therapy note (daily note);discharge summary  -MB therapy note (daily note)  -EL    Subjective Information  agree to therapy;complains of;pain;weakness  -MB agree to therapy;complains of;pain  -EL    Patient Effort, Rehab Treatment  excellent  -MB excellent  -EL    Symptoms Noted During/After Treatment  fatigue  -MB increased pain  -EL    Precautions/Limitations  fall precautions;spinal precautions   cervical precautions  -MB fall precautions;spinal precautions   cervical precautions  -EL    Recorded by  [MB] Kaylee Ramires, PT [EL] Stefania Bose OT    Vital Signs    Pre Systolic BP Rehab  --   VSS throughout; Nsg consent for tx.  -MB --   vitals stable throughout; nsg consent for treatment   -EL    Pre Patient Position  Sitting  -MB Supine  -EL    Intra Patient Position  Standing  -MB Standing  -EL    Post Patient  Position  Sitting  -MB Sitting  -EL    Recorded by  [MB] Kaylee Ramires, PT [EL] Stefania Bose, OT    Pain Assessment    Pain Assessment  0-10  -MB 0-10  -EL    Pain Score  2  -MB 0  -EL    Post Pain Score  3  -MB 4  -EL    Pain Type  Acute pain  -MB Acute pain  -EL    Pain Location  Neck  -MB Neck  -EL    Pain Orientation  Posterior  -MB     Pain Intervention(s)  Repositioned;Medication (See MAR);Ambulation/increased activity   Nsg aware.  -MB Repositioned;Ambulation/increased activity;Medication (See MAR)  -EL    Response to Interventions   tolerated   -EL    Recorded by  [MB] Kaylee Ramires, PT [EL] Stefania Bose, OT    Cognitive Assessment/Intervention    Current Cognitive/Communication Assessment  functional  -MB functional  -EL    Orientation Status  oriented x 4  -MB oriented x 4  -EL    Follows Commands/Answers Questions  100% of the time;able to follow single-step instructions  -MB able to follow single-step instructions;100% of the time  -EL    Personal Safety  good awareness, safety precautions  -MB WNL/WFL  -EL    Personal Safety Interventions  fall prevention program maintained;gait belt;muscle strengthening facilitated;nonskid shoes/slippers when out of bed  -MB fall prevention program maintained;nonskid shoes/slippers when out of bed;muscle strengthening facilitated;gait belt  -EL    Recorded by  [MB] Kaylee Ramires, PT [EL] Stefania Bose, OT    Bed Mobility, Assessment/Treatment    Bed Mobility, Assistive Device   bed rails;head of bed elevated  -EL    Bed Mob, Supine to Sit, [place] --  -MB not tested  -MB conditional independence  -EL    Bed Mobility, Impairments   ROM decreased;strength decreased   spinal precautions   -EL    Bed Mobility, Comment --  -MB Pt. received sitting UIC.  -MB VC for log rolling  -EL    Recorded by [MB] Kaylee Ramires, PT [MB] Kaylee Ramires, PT [EL] Stefania Bose, OT    Transfer Assessment/Treatment    Transfers, Sit-Stand [place] --   -MB contact guard assist;verbal cues required  -MB contact guard assist;verbal cues required  -EL    Transfers, Stand-Sit [place] --  -MB contact guard assist;verbal cues required  -MB contact guard assist;verbal cues required  -EL    Transfers, Sit-Stand-Sit, Assist Device --  -MB --   rollator  -MB --   rollator   -EL    Toilet Transfer, [place] --  -MB contact guard assist;verbal cues required  -MB contact guard assist;verbal cues required  -EL    Toilet Transfer, Assistive Device --  -MB elevated toilet seat   rollator  -MB --   rollator   -EL    Transfer, Safety Issues --  -MB step length decreased;weight-shifting ability decreased  -MB     Transfer, Impairments --  -MB ROM decreased;strength decreased;impaired balance  -MB strength decreased;impaired balance  -EL    Transfer, Comment --  -MB VCs for safety, to lock rollator brakes prior to t/f.  -MB VC for safe body mechanics, lock brakes on rollator prior to transfers, reach back for proper hand placement; pt completed sit to stand from EOB, toilet, and practiced transfers to sitting on rollator seat with proper sequencing/ safety awareness   -EL    Recorded by [MB] Kaylee Ramires, PT [MB] Kaylee Ramires, PT [EL] Stefania Bose, OT    Gait Assessment/Treatment    Gait, [place] Level --  -MB contact guard assist;verbal cues required  -MB     Gait, Assistive Device --  -MB other (see comments)   rollator  -MB     Gait, Distance (Feet) --  -  -MB     Gait, Gait Pattern Analysis --  -MB swing-through gait  -MB     Gait, Gait Deviations --  -MB adia decreased;bilateral:;step length decreased;ataxia;left:;decreased heel strike;toe-to-floor clearance decreased  -MB     Gait, Safety Issues --  -MB balance decreased during turns;weight-shifting ability decreased  -MB     Gait, Impairments --  -MB ROM decreased;strength decreased;impaired balance;motor control impaired;pain  -MB     Gait, Comment --  -MB Pt. amb slowly w/ dec foot  clearance and heel strike L LE.  Pt. demo slight ataxia in trunk, especially noted with unilateral LE weight-bearing while advancing opposite LE.   -MB     Recorded by [MB] Kaylee Ramires, PT [MB] Kaylee Ramires, PT     Functional Mobility    Functional Mobility- Ind. Level   contact guard assist;verbal cues required  -EL    Functional Mobility- Device   rollator  -EL    Functional Mobility-Distance (Feet)   20  -EL    Functional Mobility- Safety Issues   step length decreased  -EL    Functional Mobility- Comment   good safety awareness; pt with minimal unsteadiness upon standing initially with rollator; improved standing balance and mobility throughout   -EL    Recorded by   [EL] Stefania Bose, OT    Lower Body Dressing Assessment/Training    LB Dressing Assess/Train, Clothing Type   doffing:;donning:;slipper socks  -EL    LB Dressing Assess/Train, Position   sitting  -EL    LB Dressing Assess/Train, [place]   conditional independence  -EL    LB Dressing Assess/Train, Comment   performed LB dressing using cross leg technique in sitting, maintained spinal precautions throughout; simulated use of LH shoe horn and reacher    -EL    Recorded by   [EL] Stefania Bose OT    Motor Skills/Interventions    Additional Documentation --  -MB Balance Skills Training (Group)  -MB     Recorded by [MB] Kaylee Ramires, PT [MB] Kaylee Ramires, PT     Balance Skills Training    Sitting-Level of Assistance   Distant supervision  -EL    Sitting-Balance Support   Feet supported  -EL    Sitting-Balance Activities   Reaching for objects;Trunk control activities  -EL    Standing-Level of Assistance  Contact guard  -MB Contact guard  -EL    Static Standing Balance Support  assistive device  -MB assistive device  -EL    Standing-Balance Activities  Weight Shift A-P;Weight Shift R-L  -MB Weight Shift A-P;Weight Shift R-L  -EL    Gait Balance-Level of Assistance  Contact guard  -MB Contact guard  -EL    Gait Balance  Support  assistive device  -MB assistive device  -EL    Gait Balance Activities  scanning environment R/L;side-stepping  -MB scanning environment R/L;side-stepping  -EL    Recorded by  [MB] Kaylee Ramires, PT [EL] Stefania Bose OT    Therapy Exercises    Bilateral Lower Extremities  AROM:;sitting;ankle pumps/circles;hip flexion;hip abduction/adduction;LAQ  -MB     Recorded by  [MB] Kaylee Ramires PT     Positioning and Restraints    Pre-Treatment Position  sitting in chair/recliner  -MB in bed  -EL    Post Treatment Position  chair  -MB chair  -EL    In Chair --   neck roll (towel) for comfort  -MB notified nsg;reclined;call light within reach;encouraged to call for assist;with family/caregiver;legs elevated   towel roll for comfort  -MB notified nsg;reclined;sitting;call light within reach;encouraged to call for assist;exit alarm on;legs elevated  -EL    Recorded by [MB] Kaylee Ramires, PT [MB] Kaylee Ramires, PT [EL] Stefania Bose OT      User Key  (r) = Recorded By, (t) = Taken By, (c) = Cosigned By    Initials Name Effective Dates    EMILY Ramires, PT 03/14/16 -     EL Stefania Bose, OT 06/08/16 -                 IP PT Goals       04/20/17 1204 04/19/17 0953       Bed Mobility PT LTG    Bed Mobility PT LTG, Date Established  04/19/17  -SC     Bed Mobility PT LTG, Activity Type  all bed mobility  -SC     Bed Mobility PT LTG, [place] Level  independent  -SC     Bed Mobility PT LTG, Outcome goal ongoing  -MB goal ongoing  -SC     Transfer Training PT LTG    Transfer Training PT LTG, Date Established  04/19/17  -SC     Transfer Training PT LTG, Time to Achieve  5 - 7 days  -SC     Transfer Training PT LTG, Activity Type  sit to stand/stand to sit  -SC     Transfer Training PT LTG, [place] Level  supervision required  -SC     Transfer Training PT LTG, Assist Device  walker, rolling  -SC     Transfer Training PT LTG, Outcome goal ongoing  -MB goal ongoing  -SC     Gait Training  PT LTG    Gait Training Goal PT LTG, Date Established  04/19/17  -SC     Gait Training Goal PT LTG, Time to Achieve  5 - 7 days  -SC     Gait Training Goal PT LTG, [place] Level  supervision required  -SC     Gait Training Goal PT LTG, Assist Device  walker, rolling  -SC     Gait Training Goal PT LTG, Distance to Achieve  400  -SC     Gait Training Goal PT LTG, Outcome goal ongoing  -MB goal ongoing  -SC       User Key  (r) = Recorded By, (t) = Taken By, (c) = Cosigned By    Initials Name Provider Type    SC Rosa Maria Pan, PT Physical Therapist    EMILY Ramires, PT Physical Therapist          Physical Therapy Education     Title: PT OT SLP Therapies (Done)     Topic: Physical Therapy (Done)     Point: Mobility training (Done)    Learning Progress Summary    Learner Readiness Method Response Comment Documented by Status   Patient Acceptance E,ZEYNEP URRUTIA HEP (w/ handout), gait mechanics and safety w/ gait, fall precautions MB 04/20/17 1203 Done    Eager E,D,H,TB ZEYNEP MICHELLE,NR reviewed precautions, hep, safety with gait SC 04/19/17 0952 Done   Significant Other Acceptance E,D ZEYNEP MICHELLE HEP (w/ handout), gait mechanics and safety w/ gait, fall precautions MB 04/20/17 1203 Done               Point: Home exercise program (Done)    Learning Progress Summary    Learner Readiness Method Response Comment Documented by Status   Patient Acceptance E,ZEYNEP URRUTIA HEP (w/ handout), gait mechanics and safety w/ gait, fall precautions MB 04/20/17 1203 Done    Eager E,D,H,TB ZEYNEP MICHELLE,NR reviewed precautions, hep, safety with gait SC 04/19/17 0952 Done   Significant Other Acceptance E,D ZEYNEP MICHELLE HEP (w/ handout), gait mechanics and safety w/ gait, fall precautions MB 04/20/17 1203 Done               Point: Body mechanics (Done)    Learning Progress Summary    Learner Readiness Method Response Comment Documented by Status   Patient Acceptance E,ZEYNEP URRUTIA HEP (w/ handout), gait mechanics and safety w/ gait, fall precautions MB 04/20/17 1203 Done     ADAM Mendoza,H,TB ZEYNEP MICHELLE,NR reviewed precautions, hep, safety with gait SC 04/19/17 0952 Done   Significant Other Acceptance ADAM FITZGERALD DU HEP (w/ handout), gait mechanics and safety w/ gait, fall precautions MB 04/20/17 1203 Done               Point: Precautions (Done)    Learning Progress Summary    Learner Readiness Method Response Comment Documented by Status   Patient Acceptance ADAM FITZGERALD DU HEP (w/ handout), gait mechanics and safety w/ gait, fall precautions MB 04/20/17 1203 Done    Eager ADAM FITZGERALD,H,TB MIGUEL ANGEL,ZEYNEP,NR reviewed precautions, hep, safety with gait SC 04/19/17 0952 Done   Significant Other Acceptance ADAM FITZGERALD DU HEP (w/ handout), gait mechanics and safety w/ gait, fall precautions MB 04/20/17 1203 Done                      User Key     Initials Effective Dates Name Provider Type Discipline    SC 06/19/15 -  Rosa Maria Pan, PT Physical Therapist PT    MB 03/14/16 -  Kaylee Ramires, PT Physical Therapist PT                    PT Recommendation and Plan  Anticipated Equipment Needs At Discharge: gait belt (rollator walker)  Anticipated Discharge Disposition: home with assist, home with outpatient services  Planned Therapy Interventions: gait training, bed mobility training, balance training, motor coordination training, patient/family education, strengthening, transfer training  PT Frequency: daily  Plan of Care Review  Plan Of Care Reviewed With: patient  Progress: progress toward functional goals as expected  Outcome Summary/Follow up Plan: Pt. demo improved safety and ind. w/ transfers and gait w/ rollator.  Pt. amb 300 ft w/ rollator and CGA.  Pt. w/ good safety awareness w/ rollator.  Pt. will continue to benefit from PT services.  Recommend outpatient PT upon D/C.  Anticipate D/C to home later today, w/ assist.          Outcome Measures       04/20/17 1030 04/20/17 0936 04/19/17 0820    How much help from another person do you currently need...    Turning from your back to your side while in flat bed without  using bedrails? 4  -MB  4  -SC    Moving from lying on back to sitting on the side of a flat bed without bedrails? 3  -MB  3  -SC    Moving to and from a bed to a chair (including a wheelchair)? 3  -MB  3  -SC    Standing up from a chair using your arms (e.g., wheelchair, bedside chair)? 3  -MB  3  -SC    Climbing 3-5 steps with a railing? 3  -MB  3  -SC    To walk in hospital room? 3  -MB  3  -SC    AM-PAC 6 Clicks Score 19  -MB  19  -SC    How much help from another is currently needed...    Putting on and taking off regular lower body clothing?  3  -EL     Bathing (including washing, rinsing, and drying)  3  -EL     Toileting (which includes using toilet bed pan or urinal)  3  -EL     Putting on and taking off regular upper body clothing  3  -EL     Taking care of personal grooming (such as brushing teeth)  3  -EL     Eating meals  4  -EL     Score  19  -EL     Functional Assessment    Outcome Measure Options AM-PAC 6 Clicks Basic Mobility (PT)  -MB AM-PAC 6 Clicks Daily Activity (OT)  -EL AM-PAC 6 Clicks Basic Mobility (PT)  -SC      04/19/17 0819          How much help from another is currently needed...    Putting on and taking off regular lower body clothing? 3  -EL      Bathing (including washing, rinsing, and drying) 3  -EL      Toileting (which includes using toilet bed pan or urinal) 3  -EL      Putting on and taking off regular upper body clothing 3  -EL      Taking care of personal grooming (such as brushing teeth) 3  -EL      Eating meals 4  -EL      Score 19  -EL      Functional Assessment    Outcome Measure Options AM-PAC 6 Clicks Daily Activity (OT)  -EL        User Key  (r) = Recorded By, (t) = Taken By, (c) = Cosigned By    Initials Name Provider Type    MARIIA Pan, PT Physical Therapist    EMILY Ramires, PT Physical Therapist    LEA Bose, OT Occupational Therapist           Time Calculation:         PT Charges       04/20/17 1210          Time Calculation    Start Time  1030  -MB      PT Received On 04/20/17  -MB      PT Goal Re-Cert Due Date 04/29/17  -MB      Time Calculation- PT    Total Timed Code Minutes- PT 35 minute(s)  -MB        User Key  (r) = Recorded By, (t) = Taken By, (c) = Cosigned By    Initials Name Provider Type    EMILY Ramires, PT Physical Therapist          Therapy Charges for Today     Code Description Service Date Service Provider Modifiers Qty    38407589242 HC PT MOBILITY CURRENT 4/20/2017 Kaylee Ramires, PT GP, CJ 1    95325974178 HC PT MOBILITY DISCHARGE 4/20/2017 Kaylee Ramires, PT GP,  1    [id number] HC GAIT TRAINING EA 15 MIN 4/20/2017 Kaylee Ramires, PT GP 1    80964782812 HC PT THER PROC EA 15 MIN 4/20/2017 Kaylee Ramires, PT GP 1          PT G-Codes  PT Professional Judgement Used?: Yes  Outcome Measure Options: AM-PAC 6 Clicks Basic Mobility (PT)  Score: 19  Functional Limitation: Mobility: Walking and moving around  Mobility: Walking and Moving Around Current Status (): At least 20 percent but less than 40 percent impaired, limited or restricted  Mobility: Walking and Moving Around Discharge Status (): At least 20 percent but less than 40 percent impaired, limited or restricted    Kaylee Ramires PT  4/20/2017

## 2017-04-20 NOTE — PLAN OF CARE
Problem: Patient Care Overview (Adult)  Goal: Plan of Care Review  Outcome: Ongoing (interventions implemented as appropriate)    04/20/17 1158   Coping/Psychosocial Response Interventions   Plan Of Care Reviewed With patient   Outcome Evaluation   Outcome Summary/Follow up Plan Pt demo improved standing balance and mobility this date. Improved independence with transfers. Will continue to benefit from OT services to promote safety awareness, and for continuation of transfer and bed mobility training.          Problem: Laminectomy/Foraminotomy/Discectomy (Adult)  Goal: Signs and Symptoms of Listed Potential Problems Will be Absent or Manageable (Laminectomy/Foraminotomy/Discectomy)  Outcome: Ongoing (interventions implemented as appropriate)    04/20/17 1158   Laminectomy/Foraminotomy/Discectomy   Problems Assessed (Laminectomy/Laminotomy/Discectomy) functional decline/self-care deficit   Problems Present (Laminectomy/Laminotomy/Discectomy) functional decline/self-care deficit         Problem: Inpatient Occupational Therapy  Goal: Bed Mobility Goal LTG- OT  Outcome: Ongoing (interventions implemented as appropriate)    04/19/17 1126 04/20/17 1158   Bed Mobility OT LTG   Bed Mobility OT LTG, Date Established 04/19/17 --    Bed Mobility OT LTG, Time to Achieve by discharge --    Bed Mobility OT LTG, Activity Type supine to sit/sit to supine --    Bed Mobility OT LTG, [place] Level conditional independence --    Bed Mobility OT LTG, Additional Goal --  met goal for supine to sit 4/20   Bed Mobility OT LTG, Outcome --  goal partially met       Goal: Transfer Training Goal 1 LTG- OT  Outcome: Ongoing (interventions implemented as appropriate)    04/19/17 1126 04/20/17 1158   Transfer Training OT LTG   Transfer Training OT LTG, Date Established 04/19/17 --    Transfer Training OT LTG, Time to Achieve by discharge --    Transfer Training OT LTG, Activity Type sit to stand/stand to sit;toilet --    Transfer Training OT  LTG, [place] Level supervision required;verbal cues required --    Transfer Training OT LTG, Assist Device walker, rolling;commode, bedside --    Transfer Training OT LTG, Outcome --  goal ongoing       Goal: Patient Education Goal LTG- OT  Outcome: Ongoing (interventions implemented as appropriate)    04/19/17 1126 04/20/17 1158   Patient Education OT LTG   Patient Education OT LTG, Date Established 04/19/17 --    Patient Education OT LTG, Time to Achieve by discharge --    Patient Education OT LTG, Education Type precautions per surgeon;posture/body mechanics;adaptive equipment mgmt;home safety --    Patient Education OT LTG, Education Understanding demonstrates adequately;verbalizes understanding --    Patient Education OT LTG Outcome --  goal ongoing       Goal: LB Dressing Goal LTG- OT  Outcome: Outcome(s) achieved Date Met:  04/20/17 04/19/17 1126 04/20/17 1158   LB Dressing OT LTG   LB Dressing Goal OT LTG, Date Established 04/19/17 --    LB Dressing Goal OT LTG, Time to Achieve by discharge --    LB Dressing Goal OT LTG, Activity Type demo LB dressing task  --    LB Dressing Goal OT LTG, [place] Level conditional independence --    LB Dressing Goal OT LTG, Adaptive Equipment reacher;shoe horn, long handled --    LB Dressing Goal OT LTG, Outcome --  goal met

## 2017-04-20 NOTE — PLAN OF CARE
Problem: Pain, Acute (Adult)  Goal: Acceptable Pain Control/Comfort Level  Outcome: Ongoing (interventions implemented as appropriate)  Patients pain well controlled throughout the night, she slept well and ready to go home in am.

## 2017-04-20 NOTE — PLAN OF CARE
Problem: Patient Care Overview (Adult)  Goal: Plan of Care Review  Outcome: Ongoing (interventions implemented as appropriate)    04/20/17 1204   Coping/Psychosocial Response Interventions   Plan Of Care Reviewed With patient   Patient Care Overview   Progress progress toward functional goals as expected   Outcome Evaluation   Outcome Summary/Follow up Plan Pt. demo improved safety and ind. w/ transfers and gait w/ rollator. Pt. amb 300 ft w/ rollator and CGA. Pt. w/ good safety awareness w/ rollator. Pt. will continue to benefit from PT services. Recommend outpatient PT upon D/C. Anticipate D/C to home later today, w/ assist.         Problem: Inpatient Physical Therapy  Goal: Bed Mobility Goal LTG- PT  Outcome: Ongoing (interventions implemented as appropriate)    04/19/17 0953 04/20/17 1204   Bed Mobility PT LTG   Bed Mobility PT LTG, Date Established 04/19/17 --    Bed Mobility PT LTG, Activity Type all bed mobility --    Bed Mobility PT LTG, [place] Level independent --    Bed Mobility PT LTG, Outcome --  goal ongoing       Goal: Transfer Training Goal 1 LTG- PT  Outcome: Ongoing (interventions implemented as appropriate)    04/19/17 0953 04/20/17 1204   Transfer Training PT LTG   Transfer Training PT LTG, Date Established 04/19/17 --    Transfer Training PT LTG, Time to Achieve 5 - 7 days --    Transfer Training PT LTG, Activity Type sit to stand/stand to sit --    Transfer Training PT LTG, [place] Level supervision required --    Transfer Training PT LTG, Assist Device walker, rolling --    Transfer Training PT LTG, Outcome --  goal ongoing       Goal: Gait Training Goal LTG- PT  Outcome: Ongoing (interventions implemented as appropriate)    04/19/17 0953 04/20/17 1204   Gait Training PT LTG   Gait Training Goal PT LTG, Date Established 04/19/17 --    Gait Training Goal PT LTG, Time to Achieve 5 - 7 days --    Gait Training Goal PT LTG, [place] Level supervision required --    Gait Training Goal  PT LTG, Assist Device walker, rolling --    Gait Training Goal PT LTG, Distance to Achieve 400 --    Gait Training Goal PT LTG, Outcome --  goal ongoing

## 2017-04-21 ENCOUNTER — TELEPHONE (OUTPATIENT)
Dept: NEUROSURGERY | Facility: CLINIC | Age: 57
End: 2017-04-21

## 2017-04-21 DIAGNOSIS — M47.12 CERVICAL SPONDYLOSIS WITH MYELOPATHY: Primary | ICD-10-CM

## 2017-04-21 DIAGNOSIS — M48.02 SPINAL STENOSIS OF CERVICAL REGION: ICD-10-CM

## 2017-04-21 NOTE — TELEPHONE ENCOUNTER
Spoke with Melina, gave her verbal order for treatment for pt. States they will call in future if any more visits are needed

## 2017-04-21 NOTE — TELEPHONE ENCOUNTER
Provider:  Pascual  Caller:  Melina  Time of call:   4:21  Phone #:  [phone]  Surgery:  C3-4 Lami with Fusion  Surgery Date:  4/18/17  Last visit:   ED admit on 4/17  Next visit: 5/5/17    KISHA:         Reason for call:       Home health is wanting to see pt twice weekly for one week, once weekly for 2 weeks, needs order for this treatment

## 2017-04-22 NOTE — THERAPY DISCHARGE NOTE
Acute Care - Occupational Therapy Discharge Summary  [facility]     Patient Name: Maribel López  : 1960  MRN: [id number]    Today's Date: 2017  Onset of Illness/Injury or Date of Surgery Date: 17    Date of Referral to OT: 17  Referring Physician: Espinoza Garner      Admit Date: 2017        OT Recommendation and Plan    Visit Dx:    ICD-10-CM ICD-9-CM   1. Cervical spondylosis with myelopathy M47.12 721.1   2. Spinal stenosis of cervical region M48.02 723.0   3. Gait disturbance R26.9 781.2   4. Paresthesia of upper and lower extremities of both sides R20.2 782.0   5. Impaired mobility and ADLs Z74.09 799.89   6. Impaired functional mobility, balance, gait, and endurance Z74.09 V49.89                     OT Goals       17 1158 17 1126       Bed Mobility OT LTG    Bed Mobility OT LTG, Date Established  17  -EL     Bed Mobility OT LTG, Time to Achieve  by discharge  -EL     Bed Mobility OT LTG, Activity Type  supine to sit/sit to supine  -EL     Bed Mobility OT LTG, [place] Level  conditional independence  -EL     Bed Mobility OT LTG, Additional Goal met goal for supine to sit   -EL      Bed Mobility OT LTG, Outcome goal partially met  -EL      Transfer Training OT LTG    Transfer Training OT LTG, Date Established  17  -EL     Transfer Training OT LTG, Time to Achieve  by discharge  -EL     Transfer Training OT LTG, Activity Type  sit to stand/stand to sit;toilet  -EL     Transfer Training OT LTG, [place] Level  supervision required;verbal cues required  -EL     Transfer Training OT LTG, Assist Device  walker, rolling;commode, bedside  -EL     Transfer Training OT LTG, Outcome goal ongoing  -EL      Patient Education OT LTG    Patient Education OT LTG, Date Established  17  -EL     Patient Education OT LTG, Time to Achieve  by discharge  -EL     Patient Education OT LTG, Education Type  precautions per surgeon;posture/body mechanics;adaptive  equipment mgmt;home safety  -EL     Patient Education OT LTG, Education Understanding  demonstrates adequately;verbalizes understanding  -EL     Patient Education OT LTG Outcome goal ongoing  -EL      LB Dressing OT LTG    LB Dressing Goal OT LTG, Date Established  04/19/17  -EL     LB Dressing Goal OT LTG, Time to Achieve  by discharge  -EL     LB Dressing Goal OT LTG, Activity Type  demo LB dressing task   -EL     LB Dressing Goal OT LTG, [place] Level  conditional independence  -EL     LB Dressing Goal OT LTG, Adaptive Equipment  reacher;shoe horn, long handled  -EL     LB Dressing Goal OT LTG, Outcome goal met  -EL        User Key  (r) = Recorded By, (t) = Taken By, (c) = Cosigned By    Initials Name Provider Type    LEA Bose, OT Occupational Therapist                Outcome Measures       04/20/17 1030 04/20/17 0936 04/19/17 0820    How much help from another person do you currently need...    Turning from your back to your side while in flat bed without using bedrails? 4  -MB  4  -SC    Moving from lying on back to sitting on the side of a flat bed without bedrails? 3  -MB  3  -SC    Moving to and from a bed to a chair (including a wheelchair)? 3  -MB  3  -SC    Standing up from a chair using your arms (e.g., wheelchair, bedside chair)? 3  -MB  3  -SC    Climbing 3-5 steps with a railing? 3  -MB  3  -SC    To walk in hospital room? 3  -MB  3  -SC    AM-PAC 6 Clicks Score 19  -MB  19  -SC    How much help from another is currently needed...    Putting on and taking off regular lower body clothing?  3  -EL     Bathing (including washing, rinsing, and drying)  3  -EL     Toileting (which includes using toilet bed pan or urinal)  3  -EL     Putting on and taking off regular upper body clothing  3  -EL     Taking care of personal grooming (such as brushing teeth)  3  -EL     Eating meals  4  -EL     Score  19  -EL     Functional Assessment    Outcome Measure Options AM-PAC 6 Clicks Basic Mobility (PT)   -MB AM-PAC 6 Clicks Daily Activity (OT)  -EL AM-PAC 6 Clicks Basic Mobility (PT)  -SC      04/19/17 0819          How much help from another is currently needed...    Putting on and taking off regular lower body clothing? 3  -EL      Bathing (including washing, rinsing, and drying) 3  -EL      Toileting (which includes using toilet bed pan or urinal) 3  -EL      Putting on and taking off regular upper body clothing 3  -EL      Taking care of personal grooming (such as brushing teeth) 3  -EL      Eating meals 4  -EL      Score 19  -EL      Functional Assessment    Outcome Measure Options AM-PAC 6 Clicks Daily Activity (OT)  -EL        User Key  (r) = Recorded By, (t) = Taken By, (c) = Cosigned By    Initials Name Provider Type    SC Rosa Maria Pan, PT Physical Therapist    EMILY Ramires, PT Physical Therapist    LEA Bose, OT Occupational Therapist              OT Discharge Summary  Reason for Discharge: Discharge from facility  Outcomes Achieved: Refer to plan of care for updates on goals achieved  Discharge Destination: Home with home health      Janie Sanon, OT  4/22/2017

## 2017-05-05 ENCOUNTER — OFFICE VISIT (OUTPATIENT)
Dept: NEUROSURGERY | Facility: CLINIC | Age: 57
End: 2017-05-05

## 2017-05-05 VITALS
DIASTOLIC BLOOD PRESSURE: 76 MMHG | WEIGHT: 127 LBS | HEIGHT: 60 IN | BODY MASS INDEX: 24.94 KG/M2 | SYSTOLIC BLOOD PRESSURE: 138 MMHG | TEMPERATURE: 97.5 F

## 2017-05-05 DIAGNOSIS — M48.02 SPINAL STENOSIS OF CERVICAL REGION: Primary | ICD-10-CM

## 2017-05-05 DIAGNOSIS — M47.12 CERVICAL SPONDYLOSIS WITH MYELOPATHY: ICD-10-CM

## 2017-05-05 PROCEDURE — 99024 POSTOP FOLLOW-UP VISIT: CPT | Performed by: PHYSICIAN ASSISTANT

## 2017-05-05 RX ORDER — LACTULOSE 10 G/15ML
20 SOLUTION ORAL DAILY
COMMUNITY

## 2017-05-26 ENCOUNTER — OFFICE VISIT (OUTPATIENT)
Dept: NEUROSURGERY | Facility: CLINIC | Age: 57
End: 2017-05-26

## 2017-05-26 VITALS
BODY MASS INDEX: 24.74 KG/M2 | TEMPERATURE: 98.8 F | DIASTOLIC BLOOD PRESSURE: 78 MMHG | WEIGHT: 126 LBS | HEIGHT: 60 IN | SYSTOLIC BLOOD PRESSURE: 140 MMHG

## 2017-05-26 DIAGNOSIS — M48.02 SPINAL STENOSIS OF CERVICAL REGION: ICD-10-CM

## 2017-05-26 DIAGNOSIS — M47.12 CERVICAL SPONDYLOSIS WITH MYELOPATHY: Primary | ICD-10-CM

## 2017-05-26 PROCEDURE — 99024 POSTOP FOLLOW-UP VISIT: CPT | Performed by: PHYSICIAN ASSISTANT

## 2017-05-26 RX ORDER — GABAPENTIN 100 MG/1
200 CAPSULE ORAL 3 TIMES DAILY
Qty: 180 CAPSULE | Refills: 0 | Status: SHIPPED | OUTPATIENT
Start: 2017-05-26 | End: 2017-06-22 | Stop reason: SDUPTHER

## 2017-06-05 ENCOUNTER — TELEPHONE (OUTPATIENT)
Dept: NEUROSURGERY | Facility: CLINIC | Age: 57
End: 2017-06-05

## 2017-06-05 NOTE — TELEPHONE ENCOUNTER
Provider: Pascual  Caller: Self  Time of call: 11:08AM     Phone #:  [phone]  Surgery:  C3-4 CERVICAL LAMINECTOMY DECOMPRESSION POSTERIOR fusion   Surgery Date:  04/18/17  Last visit:   05/26/17  Next visit: n/a        Pt left message about her work excuse that miriam gave her on her last office visit.    The work excuse was not dated and the pt needs a new one for her employer.

## 2017-06-22 RX ORDER — GABAPENTIN 100 MG/1
CAPSULE ORAL
Qty: 180 CAPSULE | Refills: 0 | Status: SHIPPED | OUTPATIENT
Start: 2017-06-22 | End: 2017-07-05 | Stop reason: DRUGHIGH

## 2017-06-22 NOTE — TELEPHONE ENCOUNTER
Provider:  Pascual  Pharmacy: [facility]  Surgery:  Lami C3/4  Surgery Date:  04/18  Last visit:   05/26/17  Next visit: 07/05/17    Reason for call:       Automated refill request from patient's pharmacy

## 2017-07-03 ENCOUNTER — DOCUMENTATION (OUTPATIENT)
Dept: NEUROSURGERY | Facility: CLINIC | Age: 57
End: 2017-07-03

## 2017-07-05 ENCOUNTER — OFFICE VISIT (OUTPATIENT)
Dept: NEUROSURGERY | Facility: CLINIC | Age: 57
End: 2017-07-05

## 2017-07-05 VITALS
BODY MASS INDEX: 25.72 KG/M2 | HEIGHT: 60 IN | SYSTOLIC BLOOD PRESSURE: 120 MMHG | TEMPERATURE: 97.2 F | DIASTOLIC BLOOD PRESSURE: 90 MMHG | WEIGHT: 131 LBS

## 2017-07-05 DIAGNOSIS — M47.12 CERVICAL SPONDYLOSIS WITH MYELOPATHY: ICD-10-CM

## 2017-07-05 DIAGNOSIS — M48.02 SPINAL STENOSIS OF CERVICAL REGION: Primary | ICD-10-CM

## 2017-07-05 PROCEDURE — 99024 POSTOP FOLLOW-UP VISIT: CPT | Performed by: PHYSICIAN ASSISTANT

## 2017-07-05 RX ORDER — BACLOFEN 10 MG/1
10 TABLET ORAL 3 TIMES DAILY
Qty: 30 TABLET | Refills: 0 | Status: SHIPPED | OUTPATIENT
Start: 2017-07-05 | End: 2017-08-30 | Stop reason: SDUPTHER

## 2017-07-05 NOTE — PROGRESS NOTES
"Subjective     Chief Complaint:  Maribel López is a 56 y.o. female with cervical myelopathy, here today for postop follow up from a posterior C3-4 fusion    History of Present Illness  Patient is a 56 y.o. female presented to the ED with complaints of numbness of the hands for approximately one year that has been worsening over the past month as well as gait disturbance, urinary and bowel incontinence.  In the ED, she underwent an MRI of her C spine that showed severe central spinal canal stenosis at the C3/C4 level with large left paracentral disc protrusion. Narrowing of C5/C6 and C6/C7 with mass effect on the spinal cord. Dr. Garner was consulted by the ED and she underwent a C3-4 cervical laminectomy decompression posterior fusion on 4-18-17.   She has better use of her hands now and can write well, however, she states that her hands and legs burn and itch, particularly at night, and she still has bilateral hand numbness. She states that she wakes with a great deal of stiffness and some pain, which improves when she starts moving    She has been going to PT and they are working on her hand strength regularly, but she does not feel that they have worked at all on her gait. She says her gait is very stiff still.  She has been in the swimming pool in her apartment complex and notes that she has trouble with hip flexion and cannot tread water anymore.  She can step or rotate her legs backward, but not forward.    She has started gabapentin, and is currently taking 200mg TID. She notes that this helps some. She is taking a muscle relaxant at bedime. She notes that she gets muscle spasms frequently with \"jerking and shaking\" of her legs.  Her skin itches \"constantly\" at nighttime.  She has not had any pain medicine for a couple of weeks now, but said that it was helpful in the morning.      The following portions of the patient's history were reviewed and updated as appropriate: allergies, current medications, " past family history, past medical history, past social history, past surgical history and problem list.    Family history:   Family History   Problem Relation Age of Onset   • Diabetes Mother    • Hypertension Mother    • Osteoporosis Mother    • Hypertension Father    • Heart disease Father    • Osteoporosis Father    • Diabetes Maternal Grandmother    • Hypertension Maternal Grandmother    • Heart disease Paternal Grandfather    • Osteoporosis Paternal Grandfather        Social history:   Social History     Social History   • Marital status: Single     Spouse name: N/A   • Number of children: N/A   • Years of education: N/A     Occupational History   •  [employer]     Social History Main Topics   • Smoking status: Current Every Day Smoker     Packs/day: 1.00     Years: 35.00     Types: Cigarettes   • Smokeless tobacco: Not on file   • Alcohol use No   • Drug use: Yes     Special: Marijuana, Cocaine      Comment: HISTORY OF   • Sexual activity: Defer     Other Topics Concern   • Not on file     Social History Narrative       Review of Systems   Constitutional: Positive for fatigue. Negative for activity change, appetite change, chills, diaphoresis, fever and unexpected weight change.   HENT: Positive for congestion and rhinorrhea. Negative for dental problem, drooling, ear discharge, ear pain, facial swelling, hearing loss, mouth sores, nosebleeds, postnasal drip, sinus pressure, sneezing, sore throat, tinnitus, trouble swallowing and voice change.    Eyes: Negative for photophobia, pain, discharge, redness, itching and visual disturbance.   Respiratory: Negative for apnea, cough, choking, chest tightness, shortness of breath, wheezing and stridor.    Cardiovascular: Negative for chest pain, palpitations and leg swelling.   Gastrointestinal: Negative for abdominal distention, abdominal pain, anal bleeding, blood in stool, constipation, diarrhea, nausea, rectal pain and vomiting.   Endocrine: Positive  "for cold intolerance and heat intolerance. Negative for polydipsia, polyphagia and polyuria.   Genitourinary: Positive for difficulty urinating. Negative for decreased urine volume, dysuria, enuresis, flank pain, frequency, genital sores, hematuria and urgency.   Musculoskeletal: Positive for gait problem, neck pain and neck stiffness. Negative for arthralgias, back pain, joint swelling and myalgias.   Skin: Negative for color change, pallor, rash and wound.   Allergic/Immunologic: Negative for environmental allergies, food allergies and immunocompromised state.   Neurological: Positive for tremors and headaches. Negative for dizziness, seizures, syncope, facial asymmetry, speech difficulty, weakness, light-headedness and numbness.   Hematological: Negative for adenopathy. Does not bruise/bleed easily.   Psychiatric/Behavioral: Negative for agitation, behavioral problems, confusion, decreased concentration, dysphoric mood, hallucinations, self-injury, sleep disturbance and suicidal ideas. The patient is not nervous/anxious and is not hyperactive.        Objective   Blood pressure 120/90, temperature 97.2 °F (36.2 °C), height 60\" (152.4 cm), weight 131 lb (59.4 kg).  Body mass index is 25.58 kg/(m^2).    Physical Exam  Constitutional: She appears well-developed and well-nourished. No distress.   HENT:   Head: Normocephalic and atraumatic.   Eyes: EOM are normal. Pupils are equal, round, and reactive to light.   Neck: Normal range of motion. Neck supple.   Posterior cervical incision healing well with no drainage, swelling, or erythema    Pulmonary/Chest: Effort normal. No respiratory distress.   Neurological: She displays abnormal reflex (patient remains hyperreflexive 2-3+ in upper and lower extreities bilaterally. Beck's present bilatterally, JASON>RUE). No cranial nerve deficit. Coordination (clonus not elicited, but gait remains wide-based and spastic) abnormal.   Skin: Skin is warm and dry. She is not " diaphoretic. No erythema. Her incision is completely closed and healed with no swelling or separation. .    Pt is awake, alert, and oriented x3. She is pleasant and cooperative. Recent and remote memory are intact.       MOTOR: UE strength is 4/5 to direct testing.  strength is 4/5 bilaterally. No hand atrophy. Hip flexion, knee extension, ankle dorsiflexion and plantar flexion intact. Severe gait ataxia is present, with a wide-based gait.       SENSATION: numbness to light touch present in left upper extremity from shoulder to hand. In right hand, from forearm into hand, worst in three smallest fingers.   LE subjective numbness medial thigh, lateral thigh, and in bilateral calves and feet. Position sense is intact.     Assessment/Plan    Spoke with Dr. Sandip Lomas of [facility] in [place] re: working on her gait more.  We will add or refill baclofen for the muscle spasms  Gabapentin increased to 300mg TID  Percocet 5 q 6 #18 given.  (not in EPIC b/c it would not let me add it)  We will follow up with her in about 2-3 weeks with a new CT scan for Dr. Garner to review.    She is to be kept off work for the time being.       Maribel was seen today for follow-up.    Diagnoses and all orders for this visit:    Spinal stenosis of cervical region  -     CT Cervical Spine Without Contrast; Future    Cervical spondylosis with myelopathy  -     CT Cervical Spine Without Contrast; Future    Other orders  -     baclofen (LIORESAL) 10 MG tablet; Take 1 tablet by mouth 3 (Three) Times a Day.      Return in about 2 weeks (around 7/19/2017).

## 2017-07-24 ENCOUNTER — HOSPITAL ENCOUNTER (OUTPATIENT)
Dept: CT IMAGING | Facility: HOSPITAL | Age: 57
Discharge: HOME | End: 2017-07-24
Admitting: PHYSICIAN ASSISTANT

## 2017-07-24 ENCOUNTER — OFFICE VISIT (OUTPATIENT)
Dept: NEUROSURGERY | Facility: CLINIC | Age: 57
End: 2017-07-24

## 2017-07-24 VITALS
DIASTOLIC BLOOD PRESSURE: 86 MMHG | WEIGHT: 131 LBS | BODY MASS INDEX: 25.72 KG/M2 | TEMPERATURE: 97.4 F | HEIGHT: 60 IN | SYSTOLIC BLOOD PRESSURE: 148 MMHG

## 2017-07-24 DIAGNOSIS — M48.02 SPINAL STENOSIS OF CERVICAL REGION: Primary | ICD-10-CM

## 2017-07-24 DIAGNOSIS — M47.12 CERVICAL SPONDYLOSIS WITH MYELOPATHY: ICD-10-CM

## 2017-07-24 DIAGNOSIS — M48.02 SPINAL STENOSIS OF CERVICAL REGION: ICD-10-CM

## 2017-07-24 PROCEDURE — 99213 OFFICE O/P EST LOW 20 MIN: CPT | Performed by: PHYSICIAN ASSISTANT

## 2017-07-24 PROCEDURE — 72125 CT NECK SPINE W/O DYE: CPT

## 2017-07-24 RX ORDER — GABAPENTIN 300 MG/1
CAPSULE ORAL
COMMUNITY
Start: 2017-07-05 | End: 2017-07-31 | Stop reason: SDUPTHER

## 2017-07-24 RX ORDER — OXYCODONE AND ACETAMINOPHEN 7.5; 325 MG/1; MG/1
1 TABLET ORAL EVERY 6 HOURS PRN
Qty: 50 TABLET | Refills: 0 | Status: SHIPPED | OUTPATIENT
Start: 2017-07-24 | End: 2017-08-30 | Stop reason: SDUPTHER

## 2017-07-24 NOTE — PROGRESS NOTES
Subjective     Chief Complaint:  Maribel López is a 56 y.o. female here for follow up and review of postop CT scan after posterior C3-4 fusion done on 4/18/17 for severe cervical stenosis with myelopathy.     History of Present Illness  Patient is a 56 y.o. female presented to the ED with complaints of numbness of the hands for approximately one year that has been worsening over the past month as well as gait disturbance, urinary and bowel incontinence.  In the ED, she underwent an MRI of her C spine that showed severe central spinal canal stenosis at the C3/C4 level with large left paracentral disc protrusion. Narrowing of C5/C6 and C6/C7 with mass effect on the spinal cord. Dr. Garner was consulted by the ED and she underwent a C3-4 cervical laminectomy decompression posterior fusion on 4-18-17.   She still has numbness of her finger tips bilaterally, which has been unchanged since before her surgery.  She has better use of her hands, however, and is somewhat more functional. PT is working with her on her walking and this has improved quite a bit. She is able to walk more naturally, in her opinion, and is encouraged by this progress, although she is still very limited and cannot work or do the things she was doing before that involve fine manipulation of papers, walking long distances and climbing ladders, to retrieve records.  She is working diligently with PT, however.  She is taking gabapentin and percocet for continuing pain, much of which is muscle spasm pain. We have had her on baclofen, but it does not seem to be helping very much.    She also notes some difficulty with bowel and bladder sensation. She has superficial sensation and can control both, but cannot tell when bowel or bladder are full.    The following portions of the patient's history were reviewed and updated as appropriate: allergies, current medications, past family history, past medical history, past social history, past surgical  history and problem list.    Family history:   Family History   Problem Relation Age of Onset   • Diabetes Mother    • Hypertension Mother    • Osteoporosis Mother    • Hypertension Father    • Heart disease Father    • Osteoporosis Father    • Diabetes Maternal Grandmother    • Hypertension Maternal Grandmother    • Heart disease Paternal Grandfather    • Osteoporosis Paternal Grandfather        Social history:   Social History     Social History   • Marital status: Single     Spouse name: N/A   • Number of children: N/A   • Years of education: N/A     Occupational History   •  [employer]     Social History Main Topics   • Smoking status: Current Every Day Smoker     Packs/day: 1.00     Years: 35.00     Types: Cigarettes   • Smokeless tobacco: Not on file   • Alcohol use No   • Drug use: Yes     Special: Marijuana, Cocaine      Comment: HISTORY OF   • Sexual activity: Defer     Other Topics Concern   • Not on file     Social History Narrative       Review of Systems   Constitutional: Negative for activity change, appetite change, chills, diaphoresis, fatigue, fever and unexpected weight change.   HENT: Positive for congestion and ear pain. Negative for dental problem, drooling, ear discharge, facial swelling, hearing loss, mouth sores, nosebleeds, postnasal drip, rhinorrhea, sinus pressure, sneezing, sore throat, tinnitus, trouble swallowing and voice change.    Eyes: Negative for photophobia, pain, discharge, redness, itching and visual disturbance.   Respiratory: Negative for apnea, cough, choking, chest tightness, shortness of breath, wheezing and stridor.    Cardiovascular: Negative for chest pain, palpitations and leg swelling.   Gastrointestinal: Negative for abdominal distention, abdominal pain, anal bleeding, blood in stool, constipation, diarrhea, nausea, rectal pain and vomiting.   Endocrine: Positive for cold intolerance and heat intolerance.   Genitourinary: Positive for difficulty  "urinating.   Musculoskeletal: Positive for neck stiffness. Negative for arthralgias, back pain, gait problem, joint swelling, myalgias and neck pain.   Skin: Negative for color change, pallor, rash and wound.   Allergic/Immunologic: Negative for environmental allergies, food allergies and immunocompromised state.   Neurological: Positive for headaches. Negative for dizziness, tremors, seizures, syncope, facial asymmetry, speech difficulty, weakness, light-headedness and numbness.   Hematological: Negative for adenopathy. Does not bruise/bleed easily.   Psychiatric/Behavioral: Positive for agitation, decreased concentration and dysphoric mood. Negative for behavioral problems, confusion, self-injury, sleep disturbance and suicidal ideas. The patient is not nervous/anxious and is not hyperactive.        Objective   Blood pressure 148/86, temperature 97.4 °F (36.3 °C), height 60\" (152.4 cm), weight 131 lb (59.4 kg).  Body mass index is 25.58 kg/(m^2).    Physical Exam   Constitutional: She is oriented to person, place, and time. She appears well-developed and well-nourished.   HENT:   Head: Normocephalic and atraumatic.   Eyes: EOM are normal. Pupils are equal, round, and reactive to light.   Neck: Normal range of motion. Neck supple.   Pulmonary/Chest: Effort normal. No respiratory distress.   Musculoskeletal:   UE and LE are still spastic, but gait has improved.  Transitions, especially are more fluid than at her previous visit.    Neurological: She is alert and oriented to person, place, and time. She displays abnormal reflex (hyperreflexive). No cranial nerve deficit.   Skin: Skin is warm and dry.   Incision well-healed     CT scan  IMPRESSION:  1.  Patient is status post posterior cervical fusion of C3-C4 without  evidence of hardware loosening or failure. Grade 1 anterolisthesis of C4  on C5 without evidence for acute subluxation, dislocation or fracture.  No critical spinal canal narrowing.  2.  Postoperative " changes in the posterior soft tissues of the neck  adjacent to C3-C4 surgical fusion site with minimal fluid likely  representing postoperative fluid collection.    Assessment/Plan   Ms. López is doing fairly well, with modest improvements in her cervical myelopathy symptoms. She is still not able to return to work, though it will be several months yet before we know what her maximum medical improvement will be.      Maribel was seen today for follow-up.    Diagnoses and all orders for this visit:    Spinal stenosis of cervical region    Cervical spondylosis with myelopathy      Return in about 6 weeks (around 9/4/2017).

## 2017-07-31 RX ORDER — GABAPENTIN 300 MG/1
300 CAPSULE ORAL 3 TIMES DAILY
Qty: 90 CAPSULE | Refills: 0 | OUTPATIENT
Start: 2017-07-31 | End: 2017-08-30 | Stop reason: SDUPTHER

## 2017-08-30 ENCOUNTER — OFFICE VISIT (OUTPATIENT)
Dept: NEUROSURGERY | Facility: CLINIC | Age: 57
End: 2017-08-30

## 2017-08-30 ENCOUNTER — TELEPHONE (OUTPATIENT)
Dept: NEUROSURGERY | Facility: CLINIC | Age: 57
End: 2017-08-30

## 2017-08-30 VITALS — TEMPERATURE: 97.6 F | HEIGHT: 60 IN | WEIGHT: 132 LBS | BODY MASS INDEX: 25.91 KG/M2

## 2017-08-30 DIAGNOSIS — M48.02 SPINAL STENOSIS OF CERVICAL REGION: Primary | ICD-10-CM

## 2017-08-30 DIAGNOSIS — M47.12 CERVICAL SPONDYLOSIS WITH MYELOPATHY: ICD-10-CM

## 2017-08-30 PROCEDURE — 99213 OFFICE O/P EST LOW 20 MIN: CPT | Performed by: PHYSICIAN ASSISTANT

## 2017-08-30 RX ORDER — BACLOFEN 10 MG/1
10 TABLET ORAL 3 TIMES DAILY
Qty: 30 TABLET | Refills: 5 | Status: SHIPPED | OUTPATIENT
Start: 2017-08-30 | End: 2018-03-05

## 2017-08-30 RX ORDER — GABAPENTIN 300 MG/1
600 CAPSULE ORAL 3 TIMES DAILY
Qty: 100 CAPSULE | Refills: 0 | Status: SHIPPED | OUTPATIENT
Start: 2017-08-30 | End: 2017-09-12 | Stop reason: SDUPTHER

## 2017-08-30 RX ORDER — OXYCODONE AND ACETAMINOPHEN 7.5; 325 MG/1; MG/1
1 TABLET ORAL EVERY 8 HOURS PRN
Qty: 50 TABLET | Refills: 0 | Status: SHIPPED | OUTPATIENT
Start: 2017-08-30 | End: 2018-03-05

## 2017-08-30 NOTE — PROGRESS NOTES
Subjective     Chief Complaint: Maribel López is a 57 y.o. female     History of Present Illness  Patient is a 56 y.o. female presented to the ED with complaints of numbness of the hands for approximately one year that has been worsening over the past month as well as gait disturbance, urinary and bowel incontinence.  MRI showed severe cervical stenosis with mass effect on the spinal cord. She  underwent a C3-4 cervical laminectomy decompression posterior fusion on 4-18-17.   She still has numbness of her finger tips bilaterally, which has been unchanged since before her surgery.  She has better use of her hands, however, and is somewhat more functional. She is attending physical therapy twice and week and spends quite a bit of time working on improving her gait.  She is still limited and cannot walk up and down stairs or climb ladders.    She is taking gabapentin and percocet for continuing pain, much of which is muscle spasm pain, especially at night. She also complains about itching in her upper extremities bilaterally.    She also notes some difficulty with bowel and bladder sensation. She has superficial sensation and can control both, but cannot tell when bowel or bladder are full.    The following portions of the patient's history were reviewed and updated as appropriate: allergies, current medications, past family history, past medical history, past social history, past surgical history and problem list.    Family history:   Family History   Problem Relation Age of Onset   • Diabetes Mother    • Hypertension Mother    • Osteoporosis Mother    • Hypertension Father    • Heart disease Father    • Osteoporosis Father    • Diabetes Maternal Grandmother    • Hypertension Maternal Grandmother    • Heart disease Paternal Grandfather    • Osteoporosis Paternal Grandfather        Social history:   Social History     Social History   • Marital status: Single     Spouse name: N/A   • Number of children: N/A   •  Years of education: N/A     Occupational History   •  [employer]     Social History Main Topics   • Smoking status: Current Every Day Smoker     Packs/day: 1.00     Years: 35.00     Types: Cigarettes   • Smokeless tobacco: Not on file   • Alcohol use No   • Drug use: Yes     Special: Marijuana, Cocaine      Comment: HISTORY OF   • Sexual activity: Defer     Other Topics Concern   • Not on file     Social History Narrative       Review of Systems   Constitutional: Negative for activity change, appetite change, chills, diaphoresis, fatigue, fever and unexpected weight change.   HENT: Positive for congestion, ear pain and sinus pressure. Negative for dental problem, drooling, ear discharge, facial swelling, hearing loss, mouth sores, nosebleeds, postnasal drip, rhinorrhea, sneezing, sore throat, tinnitus, trouble swallowing and voice change.    Eyes: Negative for photophobia, pain, discharge, redness, itching and visual disturbance.   Respiratory: Positive for cough and wheezing. Negative for apnea, choking, chest tightness, shortness of breath and stridor.    Cardiovascular: Negative for chest pain, palpitations and leg swelling.   Gastrointestinal: Positive for constipation. Negative for abdominal distention, abdominal pain, anal bleeding, blood in stool, diarrhea, nausea, rectal pain and vomiting.   Endocrine: Positive for cold intolerance and heat intolerance. Negative for polydipsia, polyphagia and polyuria.   Genitourinary: Positive for difficulty urinating. Negative for decreased urine volume, dysuria, enuresis, flank pain, frequency, genital sores, hematuria and urgency.   Musculoskeletal: Positive for gait problem and joint swelling. Negative for arthralgias, back pain, myalgias, neck pain and neck stiffness.   Skin: Negative for color change, pallor, rash and wound.   Allergic/Immunologic: Negative for environmental allergies, food allergies and immunocompromised state.   Neurological: Negative  "for dizziness, tremors, seizures, syncope, facial asymmetry, speech difficulty, weakness, light-headedness, numbness and headaches.   Hematological: Negative for adenopathy. Does not bruise/bleed easily.   Psychiatric/Behavioral: Positive for agitation and confusion. Negative for behavioral problems, decreased concentration, dysphoric mood, hallucinations, self-injury, sleep disturbance and suicidal ideas. The patient is not nervous/anxious and is not hyperactive.    All other systems reviewed and are negative.      Objective   Temperature 97.6 °F (36.4 °C), height 60\" (152.4 cm), weight 132 lb (59.9 kg).  Body mass index is 25.78 kg/(m^2).    Physical Exam  Physical Exam   Constitutional: She is oriented to person, place, and time. She appears well-developed and well-nourished.   HENT:   Head: Normocephalic and atraumatic.   Eyes: EOM are normal. Pupils are equal, round, and reactive to light.   Neck: Normal range of motion. Neck supple.   Pulmonary/Chest: Effort normal. No respiratory distress.   Musculoskeletal:   UE and LE are still spastic, but gait has improved.  Transitions, especially are more fluid than at her previous visit.  Clonus is present bilaterally, R>L  Neurological: She is alert and oriented to person, place, and time. She displays abnormal reflex (hyperreflexive). No cranial nerve deficit.   Skin: Skin is warm and dry.   Incision well-healed    Assessment/Plan   Dr. Garner would like to review an MRI of the cervical spine with and without contrast next week.  If this shows adequate decompression, we will begin to transition her to pain management.  She remains quite unsteady and unable to return to the active job she has been doing.  We will await the MRI results to discuss a timeframe for maximal medical improvement.  We will increase the neurontin  To 600mg in the afternoon, 300 for the other two doses. We will have her trial being off the Percocet for a couple of days as well to see if this " helps with her itching.       Maribel was seen today for neck pain.    Diagnoses and all orders for this visit:    Spinal stenosis of cervical region  -     MRI Cervical Spine With & Without Contrast; Future    Cervical spondylosis with myelopathy    Other orders  -     gabapentin (NEURONTIN) 300 MG capsule; Take 2 capsules by mouth 3 (Three) Times a Day.  -     baclofen (LIORESAL) 10 MG tablet; Take 1 tablet by mouth 3 (Three) Times a Day.  -     oxyCODONE-acetaminophen (PERCOCET) 7.5-325 MG per tablet; Take 1 tablet by mouth Every 8 (Eight) Hours As Needed for Moderate Pain  or Severe Pain .        Return in about 1 week (around 9/6/2017).

## 2017-09-11 RX ORDER — DIPHENHYDRAMINE HCL 25 MG
25 TABLET ORAL TAKE AS DIRECTED
Qty: 2 TABLET | Refills: 0 | Status: SHIPPED | OUTPATIENT
Start: 2017-09-11 | End: 2018-03-05

## 2017-09-11 RX ORDER — DIAZEPAM 5 MG/1
10 TABLET ORAL TAKE AS DIRECTED
Qty: 2 TABLET | Refills: 0 | OUTPATIENT
Start: 2017-09-11 | End: 2018-03-05

## 2017-09-11 NOTE — TELEPHONE ENCOUNTER
Called pt and verified pharmacy, pt aware it's being called in.  I did advise that she should have someone drive her to MRI/appt, she understood.        Called in Valium 5 mg Take one tablet 30min prior to MRI.One tablet later as needed #2 0RF to patient's pharmacy.

## 2017-09-12 DIAGNOSIS — M48.02 SPINAL STENOSIS IN CERVICAL REGION: Primary | ICD-10-CM

## 2017-09-12 RX ORDER — GABAPENTIN 300 MG/1
300 CAPSULE ORAL 3 TIMES DAILY
Qty: 90 CAPSULE | Refills: 0 | OUTPATIENT
Start: 2017-09-12 | End: 2017-10-16 | Stop reason: SDUPTHER

## 2017-09-12 NOTE — TELEPHONE ENCOUNTER
Provider:  Pascual  Pharmacy: [facility]  Surgery:  LAMI / FUSION C3/4  Surgery Date:  04/18/17  Last visit:   08/30/17  Next visit:  9/13/17    KISHA:         07/05/2017 Gabapentin 300MG 1960 90 30 Jose Rafael Sandhu [facility] [id number] [place] 1  07/05/2017 Oxycodone/Acetaminophen 325MG/5MG 1960 18 4 [facility] [id number] [facility] 1  07/28/2017 Oxycodone/Acetaminophen  325MG/7.5MG  1962 50 12 Ever Garner [facility] [id number] [place] 47 1  08/14/2017 Gabapentin 300MG 1960 90 30 [facility] [id number] [facility]    Reason for call:         Automated refill reqeust

## 2017-09-13 ENCOUNTER — OFFICE VISIT (OUTPATIENT)
Dept: NEUROSURGERY | Facility: CLINIC | Age: 57
End: 2017-09-13

## 2017-09-13 ENCOUNTER — HOSPITAL ENCOUNTER (OUTPATIENT)
Dept: MRI IMAGING | Facility: HOSPITAL | Age: 57
Discharge: HOME | End: 2017-09-13
Admitting: PHYSICIAN ASSISTANT

## 2017-09-13 VITALS
HEIGHT: 61 IN | TEMPERATURE: 97.3 F | WEIGHT: 137 LBS | OXYGEN SATURATION: 99 % | SYSTOLIC BLOOD PRESSURE: 122 MMHG | DIASTOLIC BLOOD PRESSURE: 82 MMHG | BODY MASS INDEX: 25.86 KG/M2 | HEART RATE: 62 BPM

## 2017-09-13 DIAGNOSIS — M48.02 SPINAL STENOSIS OF CERVICAL REGION: ICD-10-CM

## 2017-09-13 DIAGNOSIS — M47.12 CERVICAL SPONDYLOSIS WITH MYELOPATHY: Primary | ICD-10-CM

## 2017-09-13 DIAGNOSIS — R25.2 SPASTICITY: ICD-10-CM

## 2017-09-13 PROCEDURE — 82565 ASSAY OF CREATININE: CPT

## 2017-09-13 PROCEDURE — 72156 MRI NECK SPINE W/O & W/DYE: CPT

## 2017-09-13 PROCEDURE — A9577 INJ MULTIHANCE: HCPCS | Performed by: PHYSICIAN ASSISTANT

## 2017-09-13 PROCEDURE — 99214 OFFICE O/P EST MOD 30 MIN: CPT | Performed by: NEUROLOGICAL SURGERY

## 2017-09-13 PROCEDURE — 0 GADOBENATE DIMEGLUMINE 529 MG/ML SOLUTION: Performed by: PHYSICIAN ASSISTANT

## 2017-09-13 RX ADMIN — GADOBENATE DIMEGLUMINE 12 ML: 529 INJECTION, SOLUTION INTRAVENOUS at 10:40

## 2017-09-13 NOTE — PROGRESS NOTES
Subjective     Chief Complaint: Difficulty walking, spasticity    Patient ID: Maribel López is a 57 y.o. female is here today for follow-up.    Difficulty Walking   This is a chronic problem. The current episode started more than 1 month ago. The problem occurs constantly. The problem has been gradually improving. Associated symptoms include arthralgias, joint swelling and numbness. Pertinent negatives include no abdominal pain, chest pain, chills, congestion, coughing, diaphoresis, fatigue, fever, headaches, myalgias, nausea, neck pain, rash, sore throat, vomiting or weakness. Nothing aggravates the symptoms. She has tried nothing for the symptoms. The treatment provided no relief.       This is a 57-year-old woman who I have been following for cervical myelopathy.  She is complaining of some persistent numbness in her hands and some difficulty with walking.  Overall, she reports that her symptoms have significantly improved since surgery, about 6 months ago.  She was having some persistent symptoms, so I ordered a MRI of her cervical spine and she presents today to discuss the results of the study.    The following portions of the patient's history were reviewed and updated as appropriate: allergies, current medications, past family history, past medical history, past social history, past surgical history and problem list.    Family history:   Family History   Problem Relation Age of Onset   • Diabetes Mother    • Hypertension Mother    • Osteoporosis Mother    • Hypertension Father    • Heart disease Father    • Osteoporosis Father    • Diabetes Maternal Grandmother    • Hypertension Maternal Grandmother    • Heart disease Paternal Grandfather    • Osteoporosis Paternal Grandfather        Social history:   Social History     Social History   • Marital status: Single     Spouse name: N/A   • Number of children: N/A   • Years of education: N/A     Occupational History   •  [employer] Div     Social  History Main Topics   • Smoking status: Current Every Day Smoker     Packs/day: 1.00     Years: 35.00     Types: Cigarettes   • Smokeless tobacco: Not on file   • Alcohol use No   • Drug use: Yes     Special: Marijuana, Cocaine      Comment: HISTORY OF   • Sexual activity: Defer     Other Topics Concern   • Not on file     Social History Narrative       Review of Systems   Constitutional: Negative for activity change, appetite change, chills, diaphoresis, fatigue, fever and unexpected weight change.   HENT: Negative for congestion, dental problem, drooling, ear discharge, ear pain, facial swelling, hearing loss, mouth sores, nosebleeds, postnasal drip, rhinorrhea, sinus pressure, sneezing, sore throat, tinnitus, trouble swallowing and voice change.    Eyes: Negative for photophobia, pain, discharge, redness, itching and visual disturbance.   Respiratory: Negative for apnea, cough, choking, chest tightness, shortness of breath, wheezing and stridor.    Cardiovascular: Negative for chest pain, palpitations and leg swelling.   Gastrointestinal: Negative for abdominal distention, abdominal pain, anal bleeding, blood in stool, constipation, diarrhea, nausea, rectal pain and vomiting.   Endocrine: Negative for cold intolerance, heat intolerance, polydipsia, polyphagia and polyuria.   Genitourinary: Negative for decreased urine volume, difficulty urinating, dysuria, enuresis, flank pain, frequency, genital sores, hematuria and urgency.   Musculoskeletal: Positive for arthralgias, gait problem, joint swelling and neck stiffness. Negative for back pain, myalgias and neck pain.   Skin: Negative for color change, pallor, rash and wound.   Allergic/Immunologic: Negative for environmental allergies, food allergies and immunocompromised state.   Neurological: Positive for numbness. Negative for dizziness, tremors, seizures, syncope, facial asymmetry, speech difficulty, weakness, light-headedness and headaches.   Hematological:  "Negative for adenopathy. Does not bruise/bleed easily.   Psychiatric/Behavioral: Positive for dysphoric mood. Negative for agitation, behavioral problems, confusion, decreased concentration, hallucinations, self-injury, sleep disturbance and suicidal ideas. The patient is not nervous/anxious and is not hyperactive.    All other systems reviewed and are negative.      Objective   Blood pressure 122/82, pulse 62, temperature 97.3 °F (36.3 °C), temperature source Temporal Artery , height 61\" (154.9 cm), weight 137 lb (62.1 kg), SpO2 99 %.  Body mass index is 25.89 kg/(m^2).    Physical Exam   Constitutional: She is oriented to person, place, and time. She appears well-developed.  Non-toxic appearance.   HENT:   Head: Normocephalic and atraumatic.   Right Ear: Hearing normal.   Left Ear: Hearing normal.   Nose: Nose normal.   Eyes: Conjunctivae, EOM and lids are normal. Pupils are equal, round, and reactive to light.   Neck: Normal range of motion. No JVD present.   Cardiovascular: Normal rate and regular rhythm.    Pulses:       Radial pulses are 2+ on the right side, and 2+ on the left side.   Pulmonary/Chest: Effort normal. No stridor. No respiratory distress. She has no wheezes.   Neurological: She is alert and oriented to person, place, and time. She displays abnormal reflex. A sensory deficit is present. No cranial nerve deficit. She exhibits abnormal muscle tone. Coordination and gait abnormal. GCS eye subscore is 4. GCS verbal subscore is 5. GCS motor subscore is 6.   Reflex Scores:       Tricep reflexes are 4+ on the right side and 4+ on the left side.       Bicep reflexes are 4+ on the right side and 4+ on the left side.       Brachioradialis reflexes are 4+ on the right side and 4+ on the left side.       Patellar reflexes are 4+ on the right side and 4+ on the left side.       Achilles reflexes are 4+ on the right side and 4+ on the left side.  Yoanna sign present bilaterally, somewhat more exaggerated on " the left side.  She has several beats of nonsustained clonus in her feet bilaterally.  She has spasticity more so on the left side than the right, particularly in the left leg.   Skin: Skin is warm and dry. No rash noted. No erythema.   Psychiatric: She has a normal mood and affect. Her behavior is normal. Judgment and thought content normal.   Nursing note and vitals reviewed.        Assessment/Plan     Independent Review of Radiographic Studies:      A repeat MRI of the cervical spine that was performed here at [facility] on 9/13/17 is reviewed.  This discloses a satisfactory decompression of the spinal cord at C3 and C4.  She still has some degenerative disc disease present at C5 6 and C6 7, however in comparison to her MRI from April, there is no significant change in the mild to moderate cervical stenosis present at that level.  There are several focal areas of myelomalacia present within the spinal cord, at the level of her prior decompression, and at C5 6 and C6 7.  There is no significant change in the radiographic abnormalities between the 2 MRI scans.    Medical Decision Making:      This is a 57-year-old woman with cervical myelopathy.  She has persistent neurological deficit that has improved since her cervical decompression, however she is still having significant problems with balance and spasticity, particularly on the left side in the leg.    I recommendation is for continued physical therapy.  She is currently taking baclofen.  I have made a referral to our neurologists to determine if she might benefit from some Botox injections to help her with a chronic spasticity.  I reviewed the signs and symptoms of cervical myelopathy, I would not recommend doing anything about her cervical disc disease at C5 6 and C6 7, unless her symptoms again to worsen.    I'll see her back in clinic in 3 months, or sooner if clinically indicated.    Maribel was seen today for neck pain.    Diagnoses and all orders for this  visit:    Cervical spondylosis with myelopathy    Spinal stenosis of cervical region    Spasticity  -     Ambulatory Referral to Neurology        Return in about 3 months (around 12/13/2017).           This document signed by ROSALEE Garner MD September 13, 2017 3:11 PM

## 2017-09-15 LAB — CREAT BLDA-MCNC: 0.8 MG/DL (ref 0.6–1.3)

## 2017-10-16 DIAGNOSIS — M48.02 SPINAL STENOSIS IN CERVICAL REGION: ICD-10-CM

## 2017-10-16 RX ORDER — GABAPENTIN 300 MG/1
300 CAPSULE ORAL 3 TIMES DAILY
Qty: 90 CAPSULE | Refills: 2 | OUTPATIENT
Start: 2017-10-16 | End: 2018-01-12 | Stop reason: SDUPTHER

## 2017-12-19 ENCOUNTER — DOCUMENTATION (OUTPATIENT)
Dept: NEUROSURGERY | Facility: CLINIC | Age: 57
End: 2017-12-19

## 2017-12-19 NOTE — PROGRESS NOTES
In regards to RTW letter:    Letter was faxed to pts insurance on 12/18/17.    A signed letter was faxed to pts employer on 12/19/17.

## 2018-01-05 ENCOUNTER — DOCUMENTATION (OUTPATIENT)
Dept: NEUROSURGERY | Facility: CLINIC | Age: 58
End: 2018-01-05

## 2018-01-12 DIAGNOSIS — M48.02 SPINAL STENOSIS IN CERVICAL REGION: ICD-10-CM

## 2018-01-12 RX ORDER — GABAPENTIN 300 MG/1
300 CAPSULE ORAL 3 TIMES DAILY
Qty: 90 CAPSULE | Refills: 2 | OUTPATIENT
Start: 2018-01-12 | End: 2018-10-22 | Stop reason: SDUPTHER

## 2018-01-12 NOTE — TELEPHONE ENCOUNTER
Provider:  Pascual  Caller: pt  Time of call:   1:35  Phone #:  [phone]  Surgery: C3-4 CERVICAL LAMINECTOMY DECOMPRESSION POSTERIOR fusion   Surgery Date: 4/18/2017   Last visit:   9/13/2017  Next visit:  1/24/2018    KISHA:      09/20/2017 Gabapentin 300MG 1960 90 30 Ever Garner [facility] [id number] [facility] 1    10/26/2017 Gabapentin 300MG 1960 90 30 Ever Garner [facility] [id number] [facility] 1       Reason for call:         Requesting RF on gabapentin.

## 2018-01-24 ENCOUNTER — OFFICE VISIT (OUTPATIENT)
Dept: NEUROSURGERY | Facility: CLINIC | Age: 58
End: 2018-01-24

## 2018-01-24 VITALS
TEMPERATURE: 99 F | SYSTOLIC BLOOD PRESSURE: 136 MMHG | BODY MASS INDEX: 26.64 KG/M2 | WEIGHT: 141.1 LBS | HEIGHT: 61 IN | DIASTOLIC BLOOD PRESSURE: 70 MMHG

## 2018-01-24 DIAGNOSIS — M54.2 NECK PAIN: Primary | ICD-10-CM

## 2018-01-24 PROCEDURE — 99214 OFFICE O/P EST MOD 30 MIN: CPT | Performed by: NEUROLOGICAL SURGERY

## 2018-01-24 RX ORDER — PANTOPRAZOLE SODIUM 40 MG/1
TABLET, DELAYED RELEASE ORAL
COMMUNITY
Start: 2018-01-14

## 2018-01-24 RX ORDER — TRIAMTERENE AND HYDROCHLOROTHIAZIDE 37.5; 25 MG/1; MG/1
TABLET ORAL
COMMUNITY
Start: 2018-01-14

## 2018-01-24 RX ORDER — TRAZODONE HYDROCHLORIDE 50 MG/1
TABLET ORAL
COMMUNITY
Start: 2018-01-23

## 2018-01-24 NOTE — PROGRESS NOTES
Subjective     Chief Complaint: cervical myelopathy and persistent numbness, spacitiy    Patient ID: Maribel López is a 57 y.o. female is here today for follow-up.    Difficulty Walking   This is a chronic problem. The current episode started more than 1 year ago. The problem occurs constantly. The problem has been gradually improving. Associated symptoms include abdominal pain, coughing, neck pain and numbness (bilateral hands (first two fingers and thumb), and arms (below the elbow)). Pertinent negatives include no arthralgias, chest pain, chills, congestion, diaphoresis, fatigue, fever, headaches, joint swelling, myalgias, nausea, rash, sore throat, vomiting or weakness. Nothing aggravates the symptoms. She has tried nothing for the symptoms. The treatment provided no relief.       This is a 58 yo woman in whom I performed a cervical laminectomy and fusion in April of 2017. She has been struggling with her recovery. She is having persistent problems with balance, weakness, and spacticity.      The following portions of the patient's history were reviewed and updated as appropriate: allergies, current medications, past family history, past medical history, past social history, past surgical history and problem list.    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 Negative for appetite change, chills, diaphoresis, fatigue and fever.   HENT: Negative for congestion, dental problem, drooling, ear discharge, ear pain, facial swelling, hearing loss, mouth sores, nosebleeds, postnasal drip, rhinorrhea, sinus pressure, sneezing, sore throat, tinnitus, trouble swallowing and voice change.    Eyes: Negative for photophobia, pain, discharge, redness, itching and visual disturbance.   Respiratory: Positive for cough and shortness of breath. Negative for apnea, choking, chest tightness, wheezing and stridor.    Cardiovascular: Negative for chest pain, palpitations and leg swelling.   Gastrointestinal: Positive for abdominal pain and constipation. Negative for abdominal distention, anal bleeding, blood in stool, diarrhea, nausea, rectal pain and vomiting.   Endocrine: Positive for cold intolerance. Negative for heat intolerance, polydipsia, polyphagia and polyuria.   Genitourinary: Negative for decreased urine volume, difficulty urinating, dysuria, enuresis, flank pain, frequency, genital sores, hematuria and urgency.   Musculoskeletal: Positive for gait problem, neck pain and neck stiffness. Negative for arthralgias, back pain, joint swelling and myalgias.   Skin: Negative for color change, pallor, rash and wound.   Allergic/Immunologic: Negative for environmental allergies, food allergies and immunocompromised state.   Neurological: Positive for tremors and numbness (bilateral hands (first two fingers and thumb), and arms (below the elbow)). Negative for dizziness, seizures, syncope, facial asymmetry, speech difficulty, weakness, light-headedness  "and headaches.   Hematological: Negative for adenopathy. Does not bruise/bleed easily.   Psychiatric/Behavioral: Positive for agitation, confusion and dysphoric mood. Negative for behavioral problems, decreased concentration, hallucinations, self-injury, sleep disturbance and suicidal ideas. The patient is not nervous/anxious and is not hyperactive.    All other systems reviewed and are negative.      Objective   Blood pressure 136/70, temperature 99 °F (37.2 °C), temperature source Temporal Artery , height 154.9 cm (61\"), weight 64 kg (141 lb 1.6 oz).  Body mass index is 26.66 kg/(m^2).    Physical Exam   Constitutional: She is oriented to person, place, and time. She appears well-developed.  Non-toxic appearance.   HENT:   Head: Normocephalic and atraumatic.   Right Ear: Hearing normal.   Left Ear: Hearing normal.   Nose: Nose normal.   Eyes: Conjunctivae, EOM and lids are normal. Pupils are equal, round, and reactive to light.   Neck: Normal range of motion. No JVD present.   Cardiovascular: Normal rate and regular rhythm.    Pulses:       Radial pulses are 2+ on the right side, and 2+ on the left side.   Pulmonary/Chest: Effort normal. No stridor. No respiratory distress. She has no wheezes.   Neurological: She is alert and oriented to person, place, and time. She displays abnormal reflex. A sensory deficit is present. No cranial nerve deficit. She exhibits abnormal muscle tone. She displays a negative Romberg sign. Coordination and gait abnormal. GCS eye subscore is 4. GCS verbal subscore is 5. GCS motor subscore is 6.   Reflex Scores:       Tricep reflexes are 4+ on the right side and 4+ on the left side.       Bicep reflexes are 4+ on the right side and 4+ on the left side.       Brachioradialis reflexes are 4+ on the right side and 4+ on the left side.       Patellar reflexes are 4+ on the right side and 4+ on the left side.       Achilles reflexes are 4+ on the right side and 4+ on the left side.  Her " clonus in her lower extremities has improved.  She has 2-3 beats of nonsustained clonus bilaterally.  She is still briskly hyperreflexic in all 4 extremities.  Yoanna sign is now absent on the left, but present on the right.   Skin: Skin is warm and dry. No rash noted. No erythema.   Psychiatric: She has a normal mood and affect. Her behavior is normal. Judgment and thought content normal.   Nursing note and vitals reviewed.        Assessment/Plan     Independent Review of Radiographic Studies:      She has no new imaging for me to review.    Medical Decision Making:      This is a 57-year-old woman with cervical myelopathy.  She is making slow, but expected recovery from her myelopathy.    It is not clear to me at this point what her prospects for long-term recovery are.  Generally speaking, whatever recovery she is made 2 years out following her decompression is likely to be her new normal state.    At this point, she does appear to me to have enough problems with coordination and sensory abnormalities to preclude her from resuming work activities.    I would like to get a repeat MRI of her cervical spine to assess for any ongoing stenosis. Also, she is complaining of some new T8 radiculopathy on the left side.     Maribel was seen today for neck pain.    Diagnoses and all orders for this visit:    Neck pain  -     Cancel: MRI Cervical Spine Without Contrast; Future  -     MRI Cervical Spine With & Without Contrast; Future        Return in about 1 week (around 1/31/2018).           This document signed by ROSALEE Garner MD January 24, 2018 11:27 AM

## 2018-02-07 ENCOUNTER — APPOINTMENT (OUTPATIENT)
Dept: MRI IMAGING | Facility: HOSPITAL | Age: 58
End: 2018-02-07
Attending: NEUROLOGICAL SURGERY

## 2018-02-14 ENCOUNTER — APPOINTMENT (OUTPATIENT)
Dept: MRI IMAGING | Facility: HOSPITAL | Age: 58
End: 2018-02-14
Attending: NEUROLOGICAL SURGERY

## 2018-02-21 ENCOUNTER — APPOINTMENT (OUTPATIENT)
Dept: MRI IMAGING | Facility: HOSPITAL | Age: 58
End: 2018-02-21
Attending: NEUROLOGICAL SURGERY

## 2018-02-23 ENCOUNTER — HOSPITAL ENCOUNTER (OUTPATIENT)
Dept: MRI IMAGING | Facility: HOSPITAL | Age: 58
End: 2018-02-23
Attending: NEUROLOGICAL SURGERY

## 2018-02-23 DIAGNOSIS — M54.2 NECK PAIN: Primary | ICD-10-CM

## 2018-03-05 ENCOUNTER — OFFICE VISIT (OUTPATIENT)
Dept: NEUROSURGERY | Facility: CLINIC | Age: 58
End: 2018-03-05

## 2018-03-05 VITALS
DIASTOLIC BLOOD PRESSURE: 80 MMHG | SYSTOLIC BLOOD PRESSURE: 130 MMHG | BODY MASS INDEX: 25.9 KG/M2 | HEIGHT: 61 IN | WEIGHT: 137.2 LBS | TEMPERATURE: 97.1 F

## 2018-03-05 DIAGNOSIS — M48.02 SPINAL STENOSIS OF CERVICAL REGION: ICD-10-CM

## 2018-03-05 DIAGNOSIS — M47.12 CERVICAL SPONDYLOSIS WITH MYELOPATHY: Primary | ICD-10-CM

## 2018-03-05 PROCEDURE — 99214 OFFICE O/P EST MOD 30 MIN: CPT | Performed by: NEUROLOGICAL SURGERY

## 2018-03-05 NOTE — PROGRESS NOTES
Subjective     Chief Complaint: Cervical myelopathy, neck pain    Patient ID: Maribel López is a 57 y.o. female is here today for follow-up.    Difficulty Walking   This is a chronic problem. The current episode started more than 1 year ago. The problem occurs constantly. The problem has been gradually improving. Associated symptoms include chills, congestion, coughing, headaches, neck pain, numbness and weakness. Pertinent negatives include no abdominal pain, arthralgias, chest pain, diaphoresis, fatigue, fever, joint swelling, myalgias, nausea, rash, sore throat or vomiting. Nothing aggravates the symptoms. She has tried nothing for the symptoms. The treatment provided no relief.       This is a 57-year-old woman in whom I performed a C3 4 laminectomy with fusion for cervical myelopathy several months ago.  She has been making slow improvement.  She presents today for routine follow-up.  She had another MRI of her neck obtained.  She has been complaining of some intermittent neck pain.  She still endorses some gait instability and balance difficulties, however she states that these problems have not worsened since her last visit.    The following portions of the patient's history were reviewed and updated as appropriate: allergies, current medications, past family history, past medical history, past social history, past surgical history and problem list.    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 Negative for activity change, appetite change, diaphoresis, fatigue and fever.   HENT: Positive for congestion and rhinorrhea. Negative for dental problem, drooling, ear discharge, ear pain, facial swelling, hearing loss, mouth sores, nosebleeds, postnasal drip, sinus pressure, sneezing, sore throat, tinnitus, trouble swallowing and voice change.    Eyes: Positive for photophobia and visual disturbance. Negative for pain, discharge, redness and itching.   Respiratory: Positive for cough. Negative for apnea, choking, chest tightness, shortness of breath, wheezing and stridor.    Cardiovascular: Negative for chest pain, palpitations and leg swelling.   Gastrointestinal: Positive for abdominal distention and constipation. Negative for abdominal pain, anal bleeding, blood in stool, diarrhea, nausea, rectal pain and vomiting.   Endocrine: Positive for cold intolerance. Negative for heat intolerance, polydipsia, polyphagia and polyuria.   Genitourinary: Negative for decreased urine volume, difficulty urinating, dysuria, enuresis, flank pain, frequency, genital sores, hematuria and urgency.   Musculoskeletal: Positive for back pain, gait problem, neck pain and neck stiffness. Negative for arthralgias, joint swelling and myalgias.   Skin: Negative for color change, pallor, rash and wound.   Allergic/Immunologic: Negative for environmental allergies, food allergies and immunocompromised state.   Neurological: Positive for tremors, weakness, numbness and  "headaches. Negative for dizziness, seizures, syncope, facial asymmetry, speech difficulty and light-headedness.   Hematological: Negative for adenopathy. Does not bruise/bleed easily.   Psychiatric/Behavioral: Positive for agitation, confusion and dysphoric mood. Negative for behavioral problems, decreased concentration, hallucinations, self-injury, sleep disturbance and suicidal ideas. The patient is not nervous/anxious and is not hyperactive.        Objective   Blood pressure 130/80, temperature 97.1 °F (36.2 °C), height 155 cm (61.01\"), weight 62.2 kg (137 lb 3.2 oz).  Body mass index is 25.92 kg/(m^2).    Physical Exam   Constitutional: She is oriented to person, place, and time. She appears well-developed.  Non-toxic appearance.   HENT:   Head: Normocephalic and atraumatic.   Right Ear: Hearing normal.   Left Ear: Hearing normal.   Nose: Nose normal.   Eyes: Conjunctivae, EOM and lids are normal. Pupils are equal, round, and reactive to light.   Neck: Normal range of motion. No JVD present.   Cardiovascular: Normal rate and regular rhythm.    Pulses:       Radial pulses are 2+ on the right side, and 2+ on the left side.   Pulmonary/Chest: Effort normal. No stridor. No respiratory distress. She has no wheezes.   Neurological: She is alert and oriented to person, place, and time. She displays abnormal reflex. A sensory deficit is present. No cranial nerve deficit. She exhibits abnormal muscle tone. She displays a negative Romberg sign. Coordination and gait abnormal. GCS eye subscore is 4. GCS verbal subscore is 5. GCS motor subscore is 6.   Reflex Scores:       Tricep reflexes are 4+ on the right side and 4+ on the left side.       Bicep reflexes are 4+ on the right side and 4+ on the left side.       Brachioradialis reflexes are 4+ on the right side and 4+ on the left side.       Patellar reflexes are 4+ on the right side and 4+ on the left side.       Achilles reflexes are 4+ on the right side and 4+ on the " left side.  Her clonus in her lower extremities has improved.  She has no clonus in her ankles.  She is still briskly hyperreflexic in all 4 extremities.  She has a Yoanna sign present bilaterally..   Skin: Skin is warm and dry. No rash noted. No erythema.   Psychiatric: She has a normal mood and affect. Her behavior is normal. Judgment and thought content normal.   Nursing note and vitals reviewed.        Assessment/Plan     Independent Review of Radiographic Studies:      Available for my review is a MRI of the cervical spine that was performed on 3/2/18.  A comparison MRI is also available from 9/13/17.  There has been slight worsening in terms of the degenerative disc disease which is present at C5 6 and C67.  There does not appear to be dramatically worse and spinal cord compression at these levels.  She does have a small focus of T2 signal prolongation which is posterior to that C6 vertebral body again, which does not appear significantly worse compared to her prior studies.  She still has myelomalacia present posterior to the C3 4 disc space.  Her spinal cord has been adequately decompressed at C3 4 and there is no evidence of worsening subluxation or spinal instability.    Medical Decision Making:      This is a 57-year-old woman with cervical myelopathy.  Radiographically, she does have some evidence of ongoing spinal cord compression at C5 6 and C6 7, however clinically she is making slight improvements over the last few months.  I reviewed the signs and symptoms of cervical myelopathy and cervical radiculopathy with her.  I directed her to contact my office with new, or worsening symptoms, otherwise, I would like to follow up with her in 6 weeks, or sooner if clinically indicated.    Maribel was seen today for neck pain and follow-up.    Diagnoses and all orders for this visit:    Cervical spondylosis with myelopathy    Spinal stenosis of cervical region      Return in about 6 weeks (around  4/16/2018).           This document signed by ROSALEE Garner MD March 5, 2018 10:48 AM

## 2018-04-09 ENCOUNTER — OFFICE VISIT (OUTPATIENT)
Dept: NEUROSURGERY | Facility: CLINIC | Age: 58
End: 2018-04-09

## 2018-04-09 VITALS
BODY MASS INDEX: 26.06 KG/M2 | DIASTOLIC BLOOD PRESSURE: 82 MMHG | TEMPERATURE: 97.6 F | SYSTOLIC BLOOD PRESSURE: 130 MMHG | HEIGHT: 61 IN | WEIGHT: 138 LBS

## 2018-04-09 DIAGNOSIS — M47.12 CERVICAL SPONDYLOSIS WITH MYELOPATHY: Primary | ICD-10-CM

## 2018-04-09 DIAGNOSIS — R25.2 SPASTICITY: ICD-10-CM

## 2018-04-09 DIAGNOSIS — M48.02 SPINAL STENOSIS OF CERVICAL REGION: ICD-10-CM

## 2018-04-09 DIAGNOSIS — M54.2 NECK PAIN: ICD-10-CM

## 2018-04-09 DIAGNOSIS — M48.02 SPINAL STENOSIS IN CERVICAL REGION: ICD-10-CM

## 2018-04-09 PROCEDURE — 99214 OFFICE O/P EST MOD 30 MIN: CPT | Performed by: NEUROLOGICAL SURGERY

## 2018-04-09 RX ORDER — MELOXICAM 7.5 MG/1
7.5 TABLET ORAL 2 TIMES DAILY
Qty: 60 TABLET | Refills: 3 | Status: SHIPPED | OUTPATIENT
Start: 2018-04-09

## 2018-04-09 NOTE — PROGRESS NOTES
Subjective     Chief Complaint: Cervical myelopathy    Patient ID: Maribel López is a 57 y.o. female is here today for follow-up.    Difficulty Walking   This is a chronic problem. The current episode started more than 1 year ago. The problem occurs constantly. The problem has been gradually improving. Associated symptoms include abdominal pain, congestion and headaches. Pertinent negatives include no arthralgias, chest pain, chills, coughing, diaphoresis, fatigue, fever, joint swelling, myalgias, nausea, neck pain, numbness, rash, sore throat, vomiting or weakness. Nothing aggravates the symptoms. She has tried nothing for the symptoms. The treatment provided no relief.       This is a 57-year-old woman with cervical myelopathy, who is status post a cervical decompression and fusion.  Initially, she did rather well after her cervical decompression, but she feels that she has plateaued, and she is still having some persistent problems with balance and numbness/tingling in her hands.  I repeated the MRI which did not demonstrate any ongoing compression in the area of her surgery, however she does have some degenerative disc disease in the lower cervical spine which is causing some central canal stenosis.  It is unclear to me how much of her current symptomatology is related to her known spinal cord injury, or how much is due to ongoing compression lower down the cervical spine.    She reports no significant change in her symptoms since her last visit, specifically she denies any worsening or exacerbation in terms of her balance difficulty or weakness.    The following portions of the patient's history were reviewed and updated as appropriate: allergies, current medications, past family history, past medical history, past social history, past surgical history and problem list.    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 Negative for activity change, appetite change, chills, diaphoresis, fatigue and fever.   HENT: Positive for congestion. Negative for dental problem, drooling, ear discharge, ear pain, facial swelling, hearing loss, mouth sores, nosebleeds, postnasal drip, rhinorrhea, sinus pressure, sneezing, sore throat, tinnitus, trouble swallowing and voice change.    Eyes: Positive for photophobia. Negative for pain, discharge, redness, itching and visual disturbance.   Respiratory: Negative for apnea, cough, choking, chest tightness, shortness of breath, wheezing and stridor.    Cardiovascular: Negative for chest pain, palpitations and leg swelling.   Gastrointestinal: Positive for abdominal distention, abdominal pain and constipation. Negative for anal bleeding, blood in stool, diarrhea, nausea, rectal pain and vomiting.   Endocrine: Positive for cold intolerance. Negative for heat intolerance, polydipsia, polyphagia and polyuria.   Genitourinary: Positive for frequency. Negative for decreased urine volume, difficulty urinating,  "dysuria, enuresis, flank pain, genital sores, hematuria and urgency.   Musculoskeletal: Positive for gait problem and neck stiffness. Negative for arthralgias, back pain, joint swelling, myalgias and neck pain.   Skin: Positive for pallor. Negative for color change, rash and wound.   Allergic/Immunologic: Negative for environmental allergies, food allergies and immunocompromised state.   Neurological: Positive for headaches. Negative for dizziness, tremors, seizures, syncope, facial asymmetry, speech difficulty, weakness, light-headedness and numbness.   Hematological: Negative for adenopathy. Does not bruise/bleed easily.   Psychiatric/Behavioral: Positive for agitation and dysphoric mood. Negative for behavioral problems, confusion, decreased concentration, hallucinations, self-injury, sleep disturbance and suicidal ideas. The patient is not nervous/anxious and is not hyperactive.        Objective   Blood pressure 130/82, temperature 97.6 °F (36.4 °C), height 155 cm (61.02\"), weight 62.6 kg (138 lb).  Body mass index is 26.05 kg/m².    Physical Exam   Constitutional: She is oriented to person, place, and time. She appears well-developed.  Non-toxic appearance.   HENT:   Head: Normocephalic and atraumatic.   Right Ear: Hearing normal.   Left Ear: Hearing normal.   Nose: Nose normal.   Eyes: Conjunctivae, EOM and lids are normal. Pupils are equal, round, and reactive to light.   Neck: Normal range of motion. No JVD present.   Cardiovascular: Normal rate and regular rhythm.    Pulses:       Radial pulses are 2+ on the right side, and 2+ on the left side.   Pulmonary/Chest: Effort normal. No stridor. No respiratory distress. She has no wheezes.   Neurological: She is alert and oriented to person, place, and time. She displays abnormal reflex. A sensory deficit is present. No cranial nerve deficit. She exhibits abnormal muscle tone. She displays a negative Romberg sign. Coordination and gait abnormal. GCS eye subscore " is 4. GCS verbal subscore is 5. GCS motor subscore is 6.   Reflex Scores:       Tricep reflexes are 4+ on the right side and 4+ on the left side.       Bicep reflexes are 4+ on the right side and 4+ on the left side.       Brachioradialis reflexes are 4+ on the right side and 4+ on the left side.       Patellar reflexes are 4+ on the right side and 4+ on the left side.       Achilles reflexes are 4+ on the right side and 4+ on the left side.  Her clonus in her lower extremities has improved.  She has no clonus in her ankles.  She is still briskly hyperreflexic in all 4 extremities.  She has a Yoanna sign present bilaterally..   Skin: Skin is warm and dry. No rash noted. No erythema.   Psychiatric: She has a normal mood and affect. Her behavior is normal. Judgment and thought content normal.   Nursing note and vitals reviewed.        Assessment/Plan     Independent Review of Radiographic Studies:      She has no new imaging for me to review    Medical Decision Making:      This is a 57-year-old woman with cervical myelopathy.  She is recovering following a cervical decompression.  She still has some persistent symptoms which might be related to her known myelomalacia and her cervical spinal cord, or due to the spinal stenosis lower down in her cervical spine.  She does not appear to be worsening, so my recommendation would be to hold off on any additional surgical intervention at this time.    She is complaining of some intermittent neck pain, so I'm referring her for facet injections.  I reviewed the signs and symptoms of cervical myelopathy with her, and I directed her to contact my office with new or worsening symptoms, otherwise I like to follow up with her in 6 months, or sooner if clinically indicated.    Maribel was seen today for neck pain.    Diagnoses and all orders for this visit:    Cervical spondylosis with myelopathy  -     meloxicam (MOBIC) 7.5 MG tablet; Take 1 tablet by mouth 2 (Two) Times a  Day.  -     Ambulatory Referral to Pain Management    Spinal stenosis of cervical region  -     meloxicam (MOBIC) 7.5 MG tablet; Take 1 tablet by mouth 2 (Two) Times a Day.  -     Ambulatory Referral to Pain Management    Neck pain  -     meloxicam (MOBIC) 7.5 MG tablet; Take 1 tablet by mouth 2 (Two) Times a Day.  -     Ambulatory Referral to Pain Management    Spinal stenosis in cervical region  -     meloxicam (MOBIC) 7.5 MG tablet; Take 1 tablet by mouth 2 (Two) Times a Day.  -     Ambulatory Referral to Pain Management    Spasticity  -     meloxicam (MOBIC) 7.5 MG tablet; Take 1 tablet by mouth 2 (Two) Times a Day.  -     Ambulatory Referral to Pain Management        Return in about 6 months (around 10/9/2018).           This document signed by ROSALEE Garner MD April 9, 2018 1:28 PM

## 2018-10-01 ENCOUNTER — TELEPHONE (OUTPATIENT)
Dept: NEUROSURGERY | Facility: CLINIC | Age: 58
End: 2018-10-01

## 2018-10-01 ENCOUNTER — OFFICE VISIT (OUTPATIENT)
Dept: NEUROSURGERY | Facility: CLINIC | Age: 58
End: 2018-10-01

## 2018-10-01 VITALS
WEIGHT: 122 LBS | SYSTOLIC BLOOD PRESSURE: 170 MMHG | TEMPERATURE: 97.6 F | HEIGHT: 61 IN | DIASTOLIC BLOOD PRESSURE: 80 MMHG | BODY MASS INDEX: 23.03 KG/M2

## 2018-10-01 DIAGNOSIS — H92.02 LEFT EAR PAIN: ICD-10-CM

## 2018-10-01 DIAGNOSIS — R26.81 GAIT INSTABILITY: ICD-10-CM

## 2018-10-01 DIAGNOSIS — M47.12 CERVICAL SPONDYLOSIS WITH MYELOPATHY: Primary | ICD-10-CM

## 2018-10-01 DIAGNOSIS — H91.92 HEARING LOSS OF LEFT EAR, UNSPECIFIED HEARING LOSS TYPE: ICD-10-CM

## 2018-10-01 PROCEDURE — 99213 OFFICE O/P EST LOW 20 MIN: CPT | Performed by: NEUROLOGICAL SURGERY

## 2018-10-01 RX ORDER — CYCLOBENZAPRINE HCL 10 MG
10 TABLET ORAL 3 TIMES DAILY PRN
Qty: 90 TABLET | Refills: 5 | Status: SHIPPED | OUTPATIENT
Start: 2018-10-01

## 2018-10-01 NOTE — PROGRESS NOTES
Subjective     Chief Complaint: Follow-up cervical myelopathy    Patient ID: Maribel López is a 58 y.o. female is here today for follow-up.    Difficulty Walking   This is a chronic problem. The current episode started more than 1 year ago. The problem occurs constantly. The problem has been gradually improving. Associated symptoms include abdominal pain, chills, coughing, headaches, nausea, neck pain and numbness. Pertinent negatives include no arthralgias, chest pain, congestion, diaphoresis, fatigue, fever, joint swelling, myalgias, rash, sore throat, vomiting or weakness. Nothing aggravates the symptoms. She has tried nothing for the symptoms. The treatment provided no relief.       This is a 58-year-old woman who underwent a cervical decompression in April 2017.  She has had some problems with long tract sign, spasticity, and some discoordination.  She presents today for routine follow-up.  She reports no significant worsening in her symptoms.    She endorses some recent depression related to her losing her house and her boyfriend recently.  She endorses some stress incontinence.  She endorses a fall.  She denies any worsening problems with weakness in her hands.  She denies any new gait instability.    The following portions of the patient's history were reviewed and updated as appropriate: allergies, current medications, past family history, past medical history, past social history, past surgical history and problem list.    Family history:   Family History   Problem Relation Age of Onset   • Diabetes Mother    • Hypertension Mother    • Osteoporosis Mother    • Hypertension Father    • Heart disease Father    • Osteoporosis Father    • Diabetes Maternal Grandmother    • Hypertension Maternal Grandmother    • Heart disease Paternal Grandfather    • Osteoporosis Paternal Grandfather        Social history:   Social History     Social History   • Marital status: Single     Spouse name: N/A   • Number of  children: N/A   • Years of education: N/A     Occupational History   •  [employer]     Social History Main Topics   • Smoking status: Current Every Day Smoker     Packs/day: 1.00     Years: 35.00     Types: Cigarettes   • Smokeless tobacco: Not on file   • Alcohol use No   • Drug use: Yes     Types: Marijuana, Cocaine      Comment: HISTORY OF   • Sexual activity: Defer     Other Topics Concern   • Not on file     Social History Narrative   • No narrative on file       Review of Systems   Constitutional: Positive for chills and unexpected weight change. Negative for activity change, appetite change, diaphoresis, fatigue and fever.   HENT: Positive for ear pain and hearing loss. Negative for congestion, dental problem, drooling, ear discharge, facial swelling, mouth sores, nosebleeds, postnasal drip, rhinorrhea, sinus pressure, sneezing, sore throat, tinnitus, trouble swallowing and voice change.    Eyes: Negative for photophobia, pain, discharge, redness, itching and visual disturbance.   Respiratory: Positive for cough and shortness of breath. Negative for apnea, choking, chest tightness, wheezing and stridor.    Cardiovascular: Negative for chest pain, palpitations and leg swelling.   Gastrointestinal: Positive for abdominal pain, constipation, diarrhea and nausea. Negative for abdominal distention, anal bleeding, blood in stool, rectal pain and vomiting.   Endocrine: Negative for cold intolerance, heat intolerance, polydipsia, polyphagia and polyuria.   Genitourinary: Positive for difficulty urinating, flank pain and urgency. Negative for decreased urine volume, dysuria, enuresis, frequency, genital sores and hematuria.   Musculoskeletal: Positive for back pain, gait problem, neck pain and neck stiffness. Negative for arthralgias, joint swelling and myalgias.   Skin: Negative for color change, pallor, rash and wound.   Allergic/Immunologic: Negative for environmental allergies, food allergies and  "immunocompromised state.   Neurological: Positive for tremors, numbness and headaches. Negative for dizziness, seizures, syncope, facial asymmetry, speech difficulty, weakness and light-headedness.   Hematological: Negative for adenopathy. Does not bruise/bleed easily.   Psychiatric/Behavioral: Positive for agitation, confusion, decreased concentration and dysphoric mood. Negative for behavioral problems, hallucinations, self-injury, sleep disturbance and suicidal ideas. The patient is nervous/anxious. The patient is not hyperactive.        Objective   Blood pressure 170/80, temperature 97.6 °F (36.4 °C), temperature source Temporal Artery , height 155 cm (61.02\"), weight 55.3 kg (122 lb).  Body mass index is 23.03 kg/m².    Physical Exam   Constitutional: She is oriented to person, place, and time. She appears well-developed.  Non-toxic appearance.   HENT:   Head: Normocephalic and atraumatic.   Right Ear: Hearing normal.   Left Ear: Hearing normal.   Nose: Nose normal.   Eyes: Pupils are equal, round, and reactive to light. Conjunctivae, EOM and lids are normal.   Neck: Normal range of motion. No JVD present.   Cardiovascular: Normal rate and regular rhythm.    Pulses:       Radial pulses are 2+ on the right side, and 2+ on the left side.   Pulmonary/Chest: Effort normal. No stridor. No respiratory distress. She has no wheezes.   Neurological: She is alert and oriented to person, place, and time. She displays abnormal reflex. A sensory deficit is present. No cranial nerve deficit. She exhibits abnormal muscle tone. She displays a negative Romberg sign. Coordination and gait abnormal. GCS eye subscore is 4. GCS verbal subscore is 5. GCS motor subscore is 6.   Reflex Scores:       Tricep reflexes are 4+ on the right side and 4+ on the left side.       Bicep reflexes are 4+ on the right side and 4+ on the left side.       Brachioradialis reflexes are 4+ on the right side and 4+ on the left side.       Patellar " reflexes are 4+ on the right side and 4+ on the left side.       Achilles reflexes are 4+ on the right side and 4+ on the left side.  She has sustained clonus in her ankles bilaterally    She is still briskly hyperreflexic in all 4 extremities.  She has a Yoanna sign present bilaterally..   Skin: Skin is warm and dry. No rash noted. No erythema.   Psychiatric: She has a normal mood and affect. Her behavior is normal. Judgment and thought content normal.   Nursing note and vitals reviewed.        Assessment/Plan     Independent Review of Radiographic Studies:      She has no new imaging for me to review    Medical Decision Making:      This 58-year-old woman who presents to my office for follow-up.  She underwent a cervical decompression in April 2017.  She still has long tract signs referable to her myelomalacia.  She has upper motor neuron signs in her arms and legs, however her Romberg sign is absent.  I've referred her for additional physical therapy as well as ENT referral for some hearing loss in her left ear.  I like to follow up with her in about 6 months, or sooner if clinically indicated.  I did review the signs and symptoms of cervical myelopathy, and I directed her to contact my office with new, or worsening symptoms.    Maribel was seen today for cervical myelopathy.    Diagnoses and all orders for this visit:    Cervical spondylosis with myelopathy  -     Ambulatory Referral to Physical Therapy Evaluate and treat  -     cyclobenzaprine (FLEXERIL) 10 MG tablet; Take 1 tablet by mouth 3 (Three) Times a Day As Needed for Muscle Spasms.    Hearing loss of left ear, unspecified hearing loss type  -     Ambulatory Referral to Physical Therapy Evaluate and treat  -     Ambulatory Referral to ENT (Otolaryngology)  -     cyclobenzaprine (FLEXERIL) 10 MG tablet; Take 1 tablet by mouth 3 (Three) Times a Day As Needed for Muscle Spasms.    Left ear pain  -     Ambulatory Referral to Physical Therapy Evaluate and  treat  -     Ambulatory Referral to ENT (Otolaryngology)  -     cyclobenzaprine (FLEXERIL) 10 MG tablet; Take 1 tablet by mouth 3 (Three) Times a Day As Needed for Muscle Spasms.    Gait instability  -     Ambulatory Referral to Physical Therapy Evaluate and treat  -     Ambulatory Referral to ENT (Otolaryngology)  -     cyclobenzaprine (FLEXERIL) 10 MG tablet; Take 1 tablet by mouth 3 (Three) Times a Day As Needed for Muscle Spasms.        Return in about 6 months (around 4/1/2019).           This document signed by ROSALEE Garner MD October 1, 2018 1:54 PM

## 2018-10-22 DIAGNOSIS — M48.02 SPINAL STENOSIS IN CERVICAL REGION: ICD-10-CM

## 2018-10-22 RX ORDER — GABAPENTIN 300 MG/1
300 CAPSULE ORAL 3 TIMES DAILY
Qty: 90 CAPSULE | Refills: 2 | OUTPATIENT
Start: 2018-10-22

## 2019-02-25 ENCOUNTER — OFFICE VISIT (OUTPATIENT)
Dept: NEUROSURGERY | Facility: CLINIC | Age: 59
End: 2019-02-25

## 2019-02-25 VITALS
SYSTOLIC BLOOD PRESSURE: 132 MMHG | DIASTOLIC BLOOD PRESSURE: 60 MMHG | TEMPERATURE: 98.8 F | BODY MASS INDEX: 20.83 KG/M2 | HEIGHT: 64 IN | WEIGHT: 122 LBS

## 2019-02-25 DIAGNOSIS — M47.12 CERVICAL SPONDYLOSIS WITH MYELOPATHY: Primary | ICD-10-CM

## 2019-02-25 PROCEDURE — 99213 OFFICE O/P EST LOW 20 MIN: CPT | Performed by: NEUROLOGICAL SURGERY

## 2019-02-25 NOTE — PROGRESS NOTES
Subjective     Chief Complaint: Cervical myelopathy, status post cervical laminectomy and fusion    Patient ID: Maribel López is a 58 y.o. female is here today for follow-up.    Difficulty Walking   This is a chronic problem. The current episode started more than 1 year ago. The problem occurs constantly. The problem has been gradually improving. Associated symptoms include abdominal pain, chills, congestion, coughing, headaches, myalgias, nausea, neck pain and numbness. Pertinent negatives include no arthralgias, chest pain, diaphoresis, fatigue, fever, joint swelling, rash, sore throat, vomiting or weakness. Nothing aggravates the symptoms. She has tried nothing (Therapy) for the symptoms. The treatment provided moderate relief.       This is a 58-year-old woman in whom I performed a cervical laminectomy and fusion in April 2017.  She presents today for routine follow-up.  She reports a very slight improvement since her last visit in October.  She is still having issues with gait instability and balance.  She reports significant neck muscle weakness towards the end of the day.    The following portions of the patient's history were reviewed and updated as appropriate: allergies, current medications, past family history, past medical history, past social history, past surgical history and problem list.    Family history:   Family History   Problem Relation Age of Onset   • Diabetes Mother    • Hypertension Mother    • Osteoporosis Mother    • Hypertension Father    • Heart disease Father    • Osteoporosis Father    • Diabetes Maternal Grandmother    • Hypertension Maternal Grandmother    • Heart disease Paternal Grandfather    • Osteoporosis Paternal Grandfather        Social history:   Social History     Socioeconomic History   • Marital status: Single     Spouse name: Not on file   • Number of children: Not on file   • Years of education: Not on file   • Highest education level: Not on file   Social Needs  "  • Financial resource strain: Not on file   • Food insecurity - worry: Not on file   • Food insecurity - inability: Not on file   • Transportation needs - medical: Not on file   • Transportation needs - non-medical: Not on file   Occupational History   • Occupation:      Employer: [employer]   Tobacco Use   • Smoking status: Current Every Day Smoker     Packs/day: 1.00     Years: 35.00     Pack years: 35.00     Types: Cigarettes   Substance and Sexual Activity   • Alcohol use: No   • Drug use: Yes     Types: Marijuana, Cocaine     Comment: HISTORY OF   • Sexual activity: Defer   Other Topics Concern   • Not on file   Social History Narrative   • Not on file       Review of Systems   Constitutional: Positive for appetite change and chills. Negative for diaphoresis, fatigue and fever.   HENT: Positive for congestion, rhinorrhea and sinus pain. Negative for sore throat.    Eyes: Positive for photophobia.   Respiratory: Positive for cough.    Cardiovascular: Negative for chest pain.   Gastrointestinal: Positive for abdominal pain, constipation and nausea. Negative for vomiting.   Endocrine: Positive for cold intolerance.   Genitourinary: Positive for difficulty urinating.   Musculoskeletal: Positive for back pain, gait problem, myalgias, neck pain and neck stiffness. Negative for arthralgias and joint swelling.   Skin: Negative for rash.   Neurological: Positive for tremors, numbness and headaches. Negative for weakness.   Psychiatric/Behavioral: Positive for agitation, confusion, decreased concentration and dysphoric mood. The patient is nervous/anxious.    All other systems reviewed and are negative.      Objective   Blood pressure 132/60, temperature 98.8 °F (37.1 °C), height 162.6 cm (64.02\"), weight 55.3 kg (122 lb).  Body mass index is 20.93 kg/m².    Physical Exam   Constitutional: She is oriented to person, place, and time. She appears well-developed.  Non-toxic appearance.   HENT:   Head: " Normocephalic and atraumatic.   Right Ear: Hearing normal.   Left Ear: Hearing normal.   Nose: Nose normal.   Eyes: Conjunctivae, EOM and lids are normal. Pupils are equal, round, and reactive to light.   Neck: Normal range of motion. No JVD present.   Cardiovascular: Normal rate and regular rhythm.   Pulses:       Radial pulses are 2+ on the right side, and 2+ on the left side.   Pulmonary/Chest: Effort normal. No stridor. No respiratory distress. She has no wheezes.   Neurological: She is alert and oriented to person, place, and time. She displays abnormal reflex. A sensory deficit is present. No cranial nerve deficit. She exhibits abnormal muscle tone. She displays a negative Romberg sign. Coordination and gait abnormal. GCS eye subscore is 4. GCS verbal subscore is 5. GCS motor subscore is 6.   Reflex Scores:       Tricep reflexes are 4+ on the right side and 4+ on the left side.       Bicep reflexes are 4+ on the right side and 4+ on the left side.       Brachioradialis reflexes are 4+ on the right side and 4+ on the left side.       Patellar reflexes are 4+ on the right side and 4+ on the left side.       Achilles reflexes are 4+ on the right side and 4+ on the left side.  She has 4-5 beats of nonsustained clonus in her ankles bilaterally    She is still briskly hyperreflexic in all 4 extremities.  She has a subtle Yoanna sign, right greater than left    She has a cross abductor response in her patellar muscle stretch reflexes bilaterally   Skin: Skin is warm and dry. No rash noted. No erythema.   Psychiatric: She has a normal mood and affect. Her behavior is normal. Judgment and thought content normal.   Nursing note and vitals reviewed.        Assessment/Plan     Independent Review of Radiographic Studies:      She has no new imaging for me to review    Medical Decision Making:      This is a 58-year-old woman with cervical myelopathy.  She is continuing to make slow progress.  I would like to follow-up  with her in about 6 months, or sooner if clinically indicated.  There is no indication for surgical intervention at this point.  She needs continued physical and occupational therapy.    Maribel was seen today for follow-up.    Diagnoses and all orders for this visit:    Cervical spondylosis with myelopathy  -     Miscellaneous DME        Return in about 6 months (around 8/25/2019).           This document signed by ROSALEE Garner MD February 25, 2019 4:15 PM

## 2019-08-28 ENCOUNTER — OFFICE VISIT (OUTPATIENT)
Dept: NEUROSURGERY | Facility: CLINIC | Age: 59
End: 2019-08-28

## 2019-08-28 VITALS
WEIGHT: 111 LBS | DIASTOLIC BLOOD PRESSURE: 60 MMHG | SYSTOLIC BLOOD PRESSURE: 132 MMHG | TEMPERATURE: 96.7 F | BODY MASS INDEX: 18.95 KG/M2 | HEIGHT: 64 IN

## 2019-08-28 DIAGNOSIS — M47.12 CERVICAL SPONDYLOSIS WITH MYELOPATHY: Primary | ICD-10-CM

## 2019-08-28 PROCEDURE — 99242 OFF/OP CONSLTJ NEW/EST SF 20: CPT | Performed by: NEUROLOGICAL SURGERY

## 2019-08-28 NOTE — PROGRESS NOTES
Subjective     Chief Complaint: Cervical myelopathy    Patient ID: Maribel López is a 59 y.o. female is here today for follow-up.    History of Present Illness    This is a 59-year-old woman who has been following with some cervical myelopathy.  Unfortunately, she has been left with some gait instability, and chronic numbness/weakness in her hands and feet.  She has completed her physical therapy.  She reports that she has basically plateaued in terms of her recovery.    The following portions of the patient's history were reviewed and updated as appropriate: allergies, current medications, past family history, past medical history, past social history, past surgical history and problem list.    Family history:   Family History   Problem Relation Age of Onset   • Diabetes Mother    • Hypertension Mother    • Osteoporosis Mother    • Hypertension Father    • Heart disease Father    • Osteoporosis Father    • Diabetes Maternal Grandmother    • Hypertension Maternal Grandmother    • Heart disease Paternal Grandfather    • Osteoporosis Paternal Grandfather        Social history:   Social History     Socioeconomic History   • Marital status: Single     Spouse name: Not on file   • Number of children: Not on file   • Years of education: Not on file   • Highest education level: Not on file   Occupational History   • Occupation:      Employer: [employer]   Tobacco Use   • Smoking status: Current Every Day Smoker     Packs/day: 1.00     Years: 35.00     Pack years: 35.00     Types: Cigarettes   Substance and Sexual Activity   • Alcohol use: No   • Drug use: Yes     Types: Marijuana, Cocaine     Comment: HISTORY OF   • Sexual activity: Defer       Review of Systems   Constitutional: Positive for fatigue. Negative for activity change, appetite change, chills, diaphoresis, fever and unexpected weight change.   HENT: Negative for congestion, dental problem, drooling, ear discharge, ear pain, facial swelling,  "hearing loss, mouth sores, nosebleeds, postnasal drip, rhinorrhea, sinus pressure, sneezing, sore throat, tinnitus, trouble swallowing and voice change.    Eyes: Negative for photophobia, pain, discharge, redness, itching and visual disturbance.   Respiratory: Positive for cough. Negative for apnea, choking, chest tightness, shortness of breath, wheezing and stridor.    Cardiovascular: Negative for chest pain, palpitations and leg swelling.   Gastrointestinal: Positive for constipation. Negative for abdominal distention, abdominal pain, anal bleeding, blood in stool, diarrhea, nausea, rectal pain and vomiting.   Endocrine: Positive for cold intolerance and heat intolerance. Negative for polydipsia, polyphagia and polyuria.   Genitourinary: Negative for decreased urine volume, difficulty urinating, dysuria, enuresis, flank pain, frequency, genital sores, hematuria and urgency.   Musculoskeletal: Positive for back pain, gait problem, neck pain and neck stiffness. Negative for arthralgias, joint swelling and myalgias.   Skin: Negative for color change, pallor, rash and wound.   Allergic/Immunologic: Negative for environmental allergies, food allergies and immunocompromised state.   Neurological: Positive for tremors, weakness and numbness. Negative for dizziness, seizures, syncope, facial asymmetry, speech difficulty, light-headedness and headaches.   Hematological: Negative for adenopathy. Does not bruise/bleed easily.   Psychiatric/Behavioral: Positive for confusion, decreased concentration and dysphoric mood. Negative for agitation, behavioral problems, hallucinations, self-injury, sleep disturbance and suicidal ideas. The patient is nervous/anxious. The patient is not hyperactive.        Objective   Blood pressure 132/60, temperature 96.7 °F (35.9 °C), temperature source Temporal, height 162.6 cm (64.02\"), weight 50.3 kg (111 lb).  Body mass index is 19.04 kg/m².    Physical Exam   Constitutional: She is oriented " to person, place, and time. She appears well-developed.  Non-toxic appearance.   HENT:   Head: Normocephalic and atraumatic.   Right Ear: Hearing normal.   Left Ear: Hearing normal.   Nose: Nose normal.   Eyes: Conjunctivae, EOM and lids are normal. Pupils are equal, round, and reactive to light.   Neck: Normal range of motion. No JVD present.   Cardiovascular: Normal rate and regular rhythm.   Pulses:       Radial pulses are 2+ on the right side, and 2+ on the left side.   Pulmonary/Chest: Effort normal. No stridor. No respiratory distress. She has no wheezes.   Neurological: She is alert and oriented to person, place, and time. She displays abnormal reflex. A sensory deficit is present. No cranial nerve deficit. She exhibits abnormal muscle tone. She displays a negative Romberg sign. Coordination and gait abnormal. GCS eye subscore is 4. GCS verbal subscore is 5. GCS motor subscore is 6.   Reflex Scores:       Tricep reflexes are 4+ on the right side and 4+ on the left side.       Bicep reflexes are 4+ on the right side and 4+ on the left side.       Brachioradialis reflexes are 4+ on the right side and 4+ on the left side.       Patellar reflexes are 4+ on the right side and 4+ on the left side.       Achilles reflexes are 4+ on the right side and 4+ on the left side.  She has 4-5 beats of nonsustained clonus in her ankles bilaterally    She is still briskly hyperreflexic in all 4 extremities.  She has a subtle Yoanna sign, right greater than left    She has a cross abductor response in her patellar muscle stretch reflexes bilaterally   Skin: Skin is warm and dry. No rash noted. No erythema.   Psychiatric: She has a normal mood and affect. Her behavior is normal. Judgment and thought content normal.   Nursing note and vitals reviewed.        Assessment/Plan     Independent Review of Radiographic Studies:      She has no new imaging for me to review    Medical Decision Making:      This is a 59-year-old woman  with cervical myelopathy.  I think she is probably gotten about as good as she is going to get in terms of recovery from her spinal cord injury.  I reviewed the signs and symptoms of cervical myelopathy with her and I directed her to contact my office with new or worsening symptoms.  I would be happy to follow-up with her on an as-needed basis moving forward.      There are no diagnoses linked to this encounter.    No Follow-up on file.           This document signed by ROSALEE Garner MD August 28, 2019 2:30 PM

## 2024-12-29 NOTE — TELEPHONE ENCOUNTER
Called in Gabapentin 300 mg TID #[id number] to patient's pharmacy    Automated refill request, no further action required, closing encounter  
Provider:  Pascual  Pharmacy: [facility]  Surgery:  L[facility]I / FUSION C3/4  Surgery Date:  04/18/17  Last visit:   07/24/17  Next visit: 08/30/17    KISHA: 07/05/2017 Gabapentin 300MG [id number] [address] [facility]    Reason for call:       Automated refill request from patient's pharmacy    CMA - needs to be called in if approved  
No

## (undated) DEVICE — SOL LR 1000ML

## (undated) DEVICE — JACKSON-PRATT 100CC BULB RESERVOIR: Brand: CARDINAL HEALTH

## (undated) DEVICE — PK NEURO DISC 10

## (undated) DEVICE — SNAP KOVER: Brand: UNBRANDED

## (undated) DEVICE — SENSR O2 OXIMAX FNGR A/ 18IN NONSTR

## (undated) DEVICE — PROXIMATE RH ROTATING HEAD SKIN STAPLERS (35 WIDE) CONTAINS 35 STAINLESS STEEL STAPLES: Brand: PROXIMATE

## (undated) DEVICE — MAYFIELD® DISPOSABLE ADULT SKULL PIN (PLASTIC BASE): Brand: MAYFIELD®

## (undated) DEVICE — SPNG GZ WOVN 4X4IN 12PLY 10/BX STRL

## (undated) DEVICE — MEDI-VAC NON-CONDUCTIVE SUCTION TUBING: Brand: CARDINAL HEALTH

## (undated) DEVICE — SUT VIC 3/0 SH CR8 18IN J864D

## (undated) DEVICE — MEDI-VAC YANKAUER SUCTION HANDLE W/BULBOUS TIP: Brand: CARDINAL HEALTH

## (undated) DEVICE — APPL CHLORAPREP W/TINT 26ML BLU

## (undated) DEVICE — FLTR HME STR UNIV W/SMPL PORT

## (undated) DEVICE — ADAPT ST INFUS ADMIN SYR 70IN

## (undated) DEVICE — SYS SKIN CLS DERMABOND PRINEO W/22CM MESH TP

## (undated) DEVICE — Device

## (undated) DEVICE — C-ARM DRAPE: Brand: DEROYAL

## (undated) DEVICE — NDL HYPO ECLPS SFTY 22G 1 1/2IN

## (undated) DEVICE — DRSNG WND GZ PAD BORDERED 4X8IN STRL

## (undated) DEVICE — JP PERF DRN SIL FLT 7MM FULL: Brand: CARDINAL HEALTH

## (undated) DEVICE — CANNULA,ADULT,SOFT-TOUCH,7TUBE,SC: Brand: MEDLINE

## (undated) DEVICE — TOOL 14MH30 LEGEND 14CM 3MM: Brand: MIDAS REX ™

## (undated) DEVICE — AIRWY 90MM NO9

## (undated) DEVICE — GLV SURG BIOGEL LTX PF 8

## (undated) DEVICE — ANTIBACTERIAL UNDYED BRAIDED (POLYGLACTIN 910), SYNTHETIC ABSORBABLE SUTURE: Brand: COATED VICRYL

## (undated) DEVICE — BIPOLAR SEALER 23-112-1 AQM 6.0: Brand: AQUAMANTYS™

## (undated) DEVICE — SYR CONTRL LUERLOK 10CC

## (undated) DEVICE — ENCORE® LATEX MICRO SIZE 8.5, STERILE LATEX POWDER-FREE SURGICAL GLOVE: Brand: ENCORE

## (undated) DEVICE — COVADERM PLUS: Brand: DEROYAL